# Patient Record
Sex: FEMALE | Race: WHITE | Employment: OTHER | URBAN - METROPOLITAN AREA
[De-identification: names, ages, dates, MRNs, and addresses within clinical notes are randomized per-mention and may not be internally consistent; named-entity substitution may affect disease eponyms.]

---

## 2017-01-04 ENCOUNTER — OFFICE VISIT (OUTPATIENT)
Dept: INTERNAL MEDICINE CLINIC | Facility: CLINIC | Age: 37
End: 2017-01-04

## 2017-01-04 VITALS
HEIGHT: 65 IN | TEMPERATURE: 98 F | BODY MASS INDEX: 23.49 KG/M2 | SYSTOLIC BLOOD PRESSURE: 102 MMHG | DIASTOLIC BLOOD PRESSURE: 58 MMHG | WEIGHT: 141 LBS | HEART RATE: 69 BPM | OXYGEN SATURATION: 99 %

## 2017-01-04 DIAGNOSIS — Z00.00 ANNUAL PHYSICAL EXAM: Primary | ICD-10-CM

## 2017-01-04 DIAGNOSIS — E55.9 VITAMIN D DEFICIENCY: ICD-10-CM

## 2017-01-04 DIAGNOSIS — R53.83 FATIGUE, UNSPECIFIED TYPE: ICD-10-CM

## 2017-01-04 PROCEDURE — 99213 OFFICE O/P EST LOW 20 MIN: CPT | Performed by: INTERNAL MEDICINE

## 2017-01-04 PROCEDURE — 99395 PREV VISIT EST AGE 18-39: CPT | Performed by: INTERNAL MEDICINE

## 2017-01-04 RX ORDER — ERGOCALCIFEROL 1.25 MG/1
50000 CAPSULE ORAL WEEKLY
Qty: 12 CAPSULE | Refills: 0 | Status: SHIPPED | OUTPATIENT
Start: 2017-01-04 | End: 2017-01-16

## 2017-01-04 NOTE — PROGRESS NOTES
Say Desouza is a 43year old female. Patient presents with:  Physical      HPI:   Say Desouza is a 43year old female who presents for a complete physical exam.      Her last physical was about 2 1/2 years ago.  Her past medical history includes hashim g Rfl: 2   SUMAtriptan Succinate (IMITREX) 50 MG Oral Tab Use at onset; repeat once after 2 hours-ONLY 2 IN 24 HR PERIOD MAX DOSE Disp: 9 tablet Rfl: 0      Past Medical History   Diagnosis Date   • Amenorrhea      Pt reports menses have always been irregu Ht 5' 5\" (1.651 m)  Wt 141 lb (63.957 kg)  BMI 23.46 kg/m2  SpO2 99%    GENERAL: well developed, well nourished, in no apparent distress  HEENT: normal oropharynx, normal TM's  EYES: PERRLA, EOMI, conjunctivae are pink  NECK: supple, no cervical or supra

## 2017-01-07 ENCOUNTER — LAB ENCOUNTER (OUTPATIENT)
Dept: LAB | Age: 37
End: 2017-01-07
Attending: INTERNAL MEDICINE
Payer: COMMERCIAL

## 2017-01-07 DIAGNOSIS — Z00.00 ANNUAL PHYSICAL EXAM: ICD-10-CM

## 2017-01-07 DIAGNOSIS — R53.83 FATIGUE, UNSPECIFIED TYPE: ICD-10-CM

## 2017-01-07 LAB
ALBUMIN SERPL BCP-MCNC: 4.5 G/DL (ref 3.5–4.8)
ALBUMIN/GLOB SERPL: 2 {RATIO} (ref 1–2)
ALP SERPL-CCNC: 58 U/L (ref 32–100)
ALT SERPL-CCNC: 16 U/L (ref 14–54)
ANION GAP SERPL CALC-SCNC: 8 MMOL/L (ref 0–18)
AST SERPL-CCNC: 22 U/L (ref 15–41)
BASOPHILS # BLD: 0.1 K/UL (ref 0–0.2)
BASOPHILS NFR BLD: 1 %
BILIRUB SERPL-MCNC: 0.9 MG/DL (ref 0.3–1.2)
BUN SERPL-MCNC: 8 MG/DL (ref 8–20)
BUN/CREAT SERPL: 9.9 (ref 10–20)
CALCIUM SERPL-MCNC: 9 MG/DL (ref 8.5–10.5)
CHLORIDE SERPL-SCNC: 107 MMOL/L (ref 95–110)
CHOLEST SERPL-MCNC: 194 MG/DL (ref 110–200)
CO2 SERPL-SCNC: 25 MMOL/L (ref 22–32)
CREAT SERPL-MCNC: 0.81 MG/DL (ref 0.5–1.5)
EOSINOPHIL # BLD: 0.1 K/UL (ref 0–0.7)
EOSINOPHIL NFR BLD: 2 %
ERYTHROCYTE [DISTWIDTH] IN BLOOD BY AUTOMATED COUNT: 12.7 % (ref 11–15)
GLOBULIN PLAS-MCNC: 2.3 G/DL (ref 2.5–3.7)
GLUCOSE SERPL-MCNC: 94 MG/DL (ref 70–99)
HCT VFR BLD AUTO: 43.9 % (ref 35–48)
HDLC SERPL-MCNC: 54 MG/DL
HGB BLD-MCNC: 14.9 G/DL (ref 12–16)
LDLC SERPL CALC-MCNC: 131 MG/DL (ref 0–99)
LYMPHOCYTES # BLD: 2.6 K/UL (ref 1–4)
LYMPHOCYTES NFR BLD: 45 %
MCH RBC QN AUTO: 32.6 PG (ref 27–32)
MCHC RBC AUTO-ENTMCNC: 33.9 G/DL (ref 32–37)
MCV RBC AUTO: 96.1 FL (ref 80–100)
MONOCYTES # BLD: 0.3 K/UL (ref 0–1)
MONOCYTES NFR BLD: 6 %
NEUTROPHILS # BLD AUTO: 2.7 K/UL (ref 1.8–7.7)
NEUTROPHILS NFR BLD: 46 %
NONHDLC SERPL-MCNC: 140 MG/DL
OSMOLALITY UR CALC.SUM OF ELEC: 288 MOSM/KG (ref 275–295)
PLATELET # BLD AUTO: 227 K/UL (ref 140–400)
PMV BLD AUTO: 10.6 FL (ref 7.4–10.3)
POTASSIUM SERPL-SCNC: 4.1 MMOL/L (ref 3.3–5.1)
PROT SERPL-MCNC: 6.8 G/DL (ref 5.9–8.4)
RBC # BLD AUTO: 4.57 M/UL (ref 3.7–5.4)
SODIUM SERPL-SCNC: 140 MMOL/L (ref 136–144)
TRIGL SERPL-MCNC: 46 MG/DL (ref 1–149)
WBC # BLD AUTO: 5.8 K/UL (ref 4–11)

## 2017-01-07 PROCEDURE — 80061 LIPID PANEL: CPT

## 2017-01-07 PROCEDURE — 80053 COMPREHEN METABOLIC PANEL: CPT

## 2017-01-07 PROCEDURE — 36415 COLL VENOUS BLD VENIPUNCTURE: CPT

## 2017-01-07 PROCEDURE — 85025 COMPLETE CBC W/AUTO DIFF WBC: CPT

## 2017-01-11 ENCOUNTER — TELEPHONE (OUTPATIENT)
Dept: INTERNAL MEDICINE CLINIC | Facility: CLINIC | Age: 37
End: 2017-01-11

## 2017-01-12 NOTE — TELEPHONE ENCOUNTER
Called patient and relayed Dr Chavo Hayden message on voicemail per hipaa. Instructed to call back with any questions.

## 2017-01-23 ENCOUNTER — OFFICE VISIT (OUTPATIENT)
Dept: OTOLARYNGOLOGY | Facility: CLINIC | Age: 37
End: 2017-01-23

## 2017-01-23 VITALS — HEIGHT: 66 IN | TEMPERATURE: 98 F | WEIGHT: 137 LBS | BODY MASS INDEX: 22.02 KG/M2

## 2017-01-23 DIAGNOSIS — R59.9 LYMPH NODE ENLARGEMENT: Primary | ICD-10-CM

## 2017-01-23 PROCEDURE — 99213 OFFICE O/P EST LOW 20 MIN: CPT | Performed by: OTOLARYNGOLOGY

## 2017-01-23 PROCEDURE — 99212 OFFICE O/P EST SF 10 MIN: CPT | Performed by: OTOLARYNGOLOGY

## 2017-01-23 RX ORDER — ERGOCALCIFEROL 1.25 MG/1
CAPSULE ORAL WEEKLY
COMMUNITY
End: 2018-08-31

## 2017-01-25 NOTE — PATIENT INSTRUCTIONS
Computed Tomography (CT)  Computed tomography (CT) is a test that combines X-rays and computer scans.  The result is a detailed picture that can show problems with soft tissues, such as the lining of your sinuses, organs, such as your kidneys or lungs, bl · You can go back to your normal diet and activities right away. Any contrast will pass naturally through your body within a day. · Before leaving, you may need to wait briefly while your images are being reviewed.  Your doctor will discuss the test result

## 2017-01-25 NOTE — PROGRESS NOTES
Elsy Flores is a 43year old female. Patient presents with:  Lymph Node: f/u acute lymphadenopathy, per pt still  swollen, no pain or tenderness     11/2014  Patient  presents with ear pain in both ears for many years.  She recently has had issues with No  Exercise                Yes    Comment:1 x week  weights and cardio  Seat Belt               Yes        Family History   Problem Relation Age of Onset   • Alcohol and Other Disorders Associated Mother         • Danny Alexus Son Overall appearance - Normal.   Psychiatric Normal Orientation - Oriented to time, place, person & situation. Appropriate mood and affect.    Neck Exam Normal Inspection - Normal. Palpation - Normal. Parotid gland - Normal. Thyroid gland - Normal.   Eyes Nor contrast consider open biopsy.     David Brown MD

## 2017-01-30 ENCOUNTER — TELEPHONE (OUTPATIENT)
Dept: OTOLARYNGOLOGY | Facility: CLINIC | Age: 37
End: 2017-01-30

## 2017-01-30 NOTE — TELEPHONE ENCOUNTER
Called pt's insurance [phone], [facility] a [facility] is not needed for CT soft tissue of neck, reference number [id number]. Left message for pt to call back to inform that she can schedule CT scan at this time.  It is the patient's re

## 2017-02-07 ENCOUNTER — TELEPHONE (OUTPATIENT)
Dept: INTERNAL MEDICINE CLINIC | Facility: CLINIC | Age: 37
End: 2017-02-07

## 2017-02-07 RX ORDER — AMOXICILLIN AND CLAVULANATE POTASSIUM 875; 125 MG/1; MG/1
1 TABLET, FILM COATED ORAL 2 TIMES DAILY
Qty: 20 TABLET | Refills: 0 | Status: SHIPPED | OUTPATIENT
Start: 2017-02-07 | End: 2017-04-12 | Stop reason: ALTCHOICE

## 2017-02-07 NOTE — TELEPHONE ENCOUNTER
Patient is complaining of sinus pain/pressure since Sunday, runny nose with greenish-yellowish mucus, pressure in the teeth, general malaise. Chills Saturday night. Cough just from the sensation that she constantly needs to clear her throat. Denies fever.

## 2017-02-07 NOTE — TELEPHONE ENCOUNTER
Pt is having sinus pain - it started on Sun. She said that she has chronic sinus problems. She is asking for antibiotic to be prescribed. She is aware that  is not here today, and that she might need to be seen. OK to leave a message on the machine.

## 2017-02-13 ENCOUNTER — APPOINTMENT (OUTPATIENT)
Dept: LAB | Age: 37
End: 2017-02-13
Attending: OTOLARYNGOLOGY
Payer: COMMERCIAL

## 2017-02-13 DIAGNOSIS — R59.9 LYMPH NODE ENLARGEMENT: ICD-10-CM

## 2017-02-13 PROCEDURE — 36415 COLL VENOUS BLD VENIPUNCTURE: CPT

## 2017-02-13 PROCEDURE — 86003 ALLG SPEC IGE CRUDE XTRC EA: CPT

## 2017-02-13 PROCEDURE — 82785 ASSAY OF IGE: CPT

## 2017-02-15 NOTE — TELEPHONE ENCOUNTER
Pt called back and was informed that she can schedule CT soft tissue of neck, a prior authorization was not needed. It is the patient's responsibility to verify benefits and out of pocket expenses for this service, pt verbalized understanding.

## 2017-02-16 LAB
A ALTERNATA IGE QN: 2.37 KUA/L (ref ?–0.1)
A ALTERNATA IGE QN: 2.53 KUA/L (ref ?–0.1)
A FUMIGATUS IGE QN: <0.1 KUA/L (ref ?–0.1)
AMER SYCAMORE IGE QN: <0.1 KUA/L (ref ?–0.1)
BERMUDA GRASS IGE QN: <0.1 KUA/L (ref ?–0.1)
BOXELDER IGE QN: <0.1 KUA/L (ref ?–0.1)
C HERBARUM IGE QN: <0.1 KUA/L (ref ?–0.1)
C HERBARUM IGE QN: <0.1 KUA/L (ref ?–0.1)
CALIF WALNUT IGE QN: <0.1 KUA/L (ref ?–0.1)
CAT DANDER IGE QN: <0.1 KUA/L (ref ?–0.1)
CAT DANDER IGE QN: <0.1 KUA/L (ref ?–0.1)
CLAM IGE QN: <0.1 KUA/L (ref ?–0.1)
CMN PIGWEED IGE QN: <0.1 KUA/L (ref ?–0.1)
CODFISH IGE QN: <0.1 KUA/L (ref ?–0.1)
COMMON RAGWEED IGE QN: 0.4 KUA/L (ref ?–0.1)
COMMON RAGWEED IGE QN: 0.43 KUA/L (ref ?–0.1)
CORN IGE QN: <0.1 KUA/L (ref ?–0.1)
COTTONWOOD IGE QN: <0.1 KUA/L (ref ?–0.1)
COW MILK IGE QN: <0.1 KUA/L (ref ?–0.1)
D FARINAE IGE QN: <0.1 KUA/L (ref ?–0.1)
D FARINAE IGE QN: <0.1 KUA/L (ref ?–0.1)
D PTERONYSS IGE QN: <0.1 KUA/L (ref ?–0.1)
DOG DANDER IGE QN: <0.1 KUA/L (ref ?–0.1)
DOG DANDER IGE QN: <0.1 KUA/L (ref ?–0.1)
EGG WHITE IGE QN: <0.1 KUA/L (ref ?–0.1)
GOOSEFOOT IGE QN: <0.1 KUA/L (ref ?–0.1)
HOUSE DUST HS IGE QN: <0.1 KUA/L (ref ?–0.1)
IGE SERPL-ACNC: 12.5 KU/L (ref 2–214)
IGE SERPL-ACNC: 13.1 KU/L (ref 2–214)
IGE SERPL-ACNC: 13.1 KU/L (ref 2–214)
KENT BLUE GRASS IGE QN: <0.1 KUA/L (ref ?–0.1)
M RACEMOSUS IGE QN: <0.1 KUA/L (ref ?–0.1)
MARSH ELDER IGE QN: 0.14 KUA/L (ref ?–0.1)
MOUSE EPITH IGE QN: <0.1 KUA/L (ref ?–0.1)
MT JUNIPER IGE QN: <0.1 KUA/L (ref ?–0.1)
P NOTATUM IGE QN: <0.1 KUA/L (ref ?–0.1)
PEANUT IGE QN: <0.1 KUA/L (ref ?–0.1)
PECAN/HICK TREE IGE QN: <0.1 KUA/L (ref ?–0.1)
PER RYE GRASS IGE QN: <0.1 KUA/L (ref ?–0.1)
ROACH IGE QN: <0.1 KUA/L (ref ?–0.1)
SALTWORT IGE QN: <0.1 KUA/L (ref ?–0.1)
SCALLOP IGE QN: <0.1 KUA/L (ref ?–0.1)
SESAME SEED IGE QN: <0.1 KUA/L (ref ?–0.1)
SHRIMP IGE QN: <0.1 KUA/L (ref ?–0.1)
SOYBEAN IGE QN: <0.1 KUA/L (ref ?–0.1)
TIMOTHY IGE QN: <0.1 KUA/L (ref ?–0.1)
WALNUT IGE QN: <0.1 KUA/L (ref ?–0.1)
WHEAT IGE QN: <0.1 KUA/L (ref ?–0.1)
WHITE ASH IGE QN: <0.1 KUA/L (ref ?–0.1)
WHITE ELM IGE QN: <0.1 KUA/L (ref ?–0.1)
WHITE ELM IGE QN: <0.1 KUA/L (ref ?–0.1)
WHITE MULBERRY IGE QN: <0.1 KUA/L (ref ?–0.1)
WHITE OAK IGE QN: <0.1 KUA/L (ref ?–0.1)
WHITE OAK IGE QN: <0.1 KUA/L (ref ?–0.1)

## 2017-02-23 ENCOUNTER — TELEPHONE (OUTPATIENT)
Dept: OTOLARYNGOLOGY | Facility: CLINIC | Age: 37
End: 2017-02-23

## 2017-05-03 ENCOUNTER — TELEPHONE (OUTPATIENT)
Dept: INTERNAL MEDICINE CLINIC | Facility: CLINIC | Age: 37
End: 2017-05-03

## 2017-05-03 DIAGNOSIS — M76.899 HIP FLEXOR TENDINITIS, UNSPECIFIED LATERALITY: Primary | ICD-10-CM

## 2017-05-03 NOTE — TELEPHONE ENCOUNTER
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 Has taken three weeks off from running and it has not gotten better.  She went in for a consultation with [facility]; per the patient, the therapist she saw to

## 2017-05-04 NOTE — TELEPHONE ENCOUNTER
Please notify patient that I will mail the referral to her for [facility]. If her symptoms worsen, she should come in for evaluation.

## 2017-06-05 ENCOUNTER — TELEPHONE (OUTPATIENT)
Dept: ENDOCRINOLOGY CLINIC | Facility: CLINIC | Age: 37
End: 2017-06-05

## 2017-06-05 DIAGNOSIS — E03.9 HYPOTHYROIDISM, UNSPECIFIED TYPE: Primary | ICD-10-CM

## 2017-06-05 NOTE — TELEPHONE ENCOUNTER
Called patient and let her know SH would like to send her for thyroid labwork and then book follow up. May need to move up appt depending on results. Patient agreeable. Orders placed and booked patient for appt on 8/4.

## 2017-06-05 NOTE — TELEPHONE ENCOUNTER
Pt states that she has dry skin and is having more hair loss and thinks thyroid levels are off. Pt is requesting appt sooner than first available. Please call.

## 2017-06-05 NOTE — TELEPHONE ENCOUNTER
Please send for labs TSH, FT4. Schedule appt for next available and then will move up appt based on labs.

## 2017-06-14 ENCOUNTER — APPOINTMENT (OUTPATIENT)
Dept: LAB | Age: 37
End: 2017-06-14
Attending: INTERNAL MEDICINE
Payer: COMMERCIAL

## 2017-06-14 DIAGNOSIS — E03.9 HYPOTHYROIDISM, UNSPECIFIED TYPE: ICD-10-CM

## 2017-06-14 PROCEDURE — 36415 COLL VENOUS BLD VENIPUNCTURE: CPT

## 2017-06-14 PROCEDURE — 84439 ASSAY OF FREE THYROXINE: CPT

## 2017-06-14 PROCEDURE — 84443 ASSAY THYROID STIM HORMONE: CPT

## 2017-06-22 RX ORDER — LEVOTHYROXINE SODIUM 112 MCG
TABLET ORAL
Qty: 30 TABLET | Refills: 1 | Status: SHIPPED | OUTPATIENT
Start: 2017-06-22 | End: 2017-07-20

## 2017-07-20 RX ORDER — LEVOTHYROXINE SODIUM 112 MCG
TABLET ORAL
Qty: 30 TABLET | Refills: 0 | Status: SHIPPED | OUTPATIENT
Start: 2017-07-20 | End: 2017-10-06

## 2017-07-20 NOTE — TELEPHONE ENCOUNTER
Dr. Les Larkin, please review rx request for Synthroid 112 mcg. Thank you.     LOV: 5/6/17  RTC: 1 year  Last Refill: 6/22/17

## 2017-08-04 ENCOUNTER — OFFICE VISIT (OUTPATIENT)
Dept: ENDOCRINOLOGY CLINIC | Facility: CLINIC | Age: 37
End: 2017-08-04

## 2017-08-04 VITALS
DIASTOLIC BLOOD PRESSURE: 77 MMHG | WEIGHT: 147.19 LBS | BODY MASS INDEX: 24.52 KG/M2 | HEART RATE: 66 BPM | HEIGHT: 65 IN | SYSTOLIC BLOOD PRESSURE: 121 MMHG

## 2017-08-04 DIAGNOSIS — E06.3 HYPOTHYROIDISM DUE TO HASHIMOTO'S THYROIDITIS: Primary | ICD-10-CM

## 2017-08-04 DIAGNOSIS — E03.8 HYPOTHYROIDISM DUE TO HASHIMOTO'S THYROIDITIS: Primary | ICD-10-CM

## 2017-08-04 PROCEDURE — 99212 OFFICE O/P EST SF 10 MIN: CPT | Performed by: INTERNAL MEDICINE

## 2017-08-04 PROCEDURE — 99213 OFFICE O/P EST LOW 20 MIN: CPT | Performed by: INTERNAL MEDICINE

## 2017-08-04 RX ORDER — GARLIC EXTRACT 500 MG
1 CAPSULE ORAL DAILY
COMMUNITY

## 2017-08-04 RX ORDER — DOXEPIN HYDROCHLORIDE 50 MG/1
1 CAPSULE ORAL DAILY
COMMUNITY

## 2017-08-04 RX ORDER — CHLORAL HYDRATE 500 MG
1000 CAPSULE ORAL DAILY
COMMUNITY

## 2017-08-04 NOTE — PROGRESS NOTES
Name: Bruce House  Date: 8/4/2017    Referring Physician: No ref. provider found    No chief complaint on file. HISTORY OF PRESENT ILLNESS   Bruce Smoker is a 42year old female who presents for hypothyroidism.      43 y/o F presents for follow up Employer            Comment               Clinical Social Wo*                     Full Time    Social History Main Topics    Smoking status: Never Smoker                                                                Smokeless tobacco: Never Used moisture and skin texture  Hair & Nails:  normal scalp hair     Neuro:  sensory grossly intact and motor grossly intact  Psychiatric:  oriented to time, self, and place  Nutritional:  no abnormal weight gain or loss    ASSESSMENT/PLAN:    1.  Hypothyroidism

## 2017-08-22 RX ORDER — LEVOTHYROXINE SODIUM 112 MCG
TABLET ORAL
Qty: 30 TABLET | Refills: 5 | Status: SHIPPED | OUTPATIENT
Start: 2017-08-22 | End: 2017-12-29

## 2017-10-06 ENCOUNTER — OFFICE VISIT (OUTPATIENT)
Dept: INTERNAL MEDICINE CLINIC | Facility: CLINIC | Age: 37
End: 2017-10-06

## 2017-10-06 VITALS
DIASTOLIC BLOOD PRESSURE: 80 MMHG | TEMPERATURE: 98 F | SYSTOLIC BLOOD PRESSURE: 110 MMHG | WEIGHT: 151 LBS | OXYGEN SATURATION: 99 % | HEART RATE: 60 BPM | BODY MASS INDEX: 25 KG/M2

## 2017-10-06 DIAGNOSIS — F41.9 ANXIETY: ICD-10-CM

## 2017-10-06 DIAGNOSIS — B35.0 FUNGAL SCALP INFECTION: ICD-10-CM

## 2017-10-06 DIAGNOSIS — G43.809 OTHER MIGRAINE WITHOUT STATUS MIGRAINOSUS, NOT INTRACTABLE: Primary | ICD-10-CM

## 2017-10-06 DIAGNOSIS — Z23 NEED FOR VACCINATION: ICD-10-CM

## 2017-10-06 PROCEDURE — 99212 OFFICE O/P EST SF 10 MIN: CPT | Performed by: INTERNAL MEDICINE

## 2017-10-06 PROCEDURE — 99214 OFFICE O/P EST MOD 30 MIN: CPT | Performed by: INTERNAL MEDICINE

## 2017-10-06 RX ORDER — ESCITALOPRAM OXALATE 5 MG/1
TABLET ORAL
Qty: 45 TABLET | Refills: 0 | Status: SHIPPED | OUTPATIENT
Start: 2017-10-06 | End: 2017-11-15

## 2017-10-06 RX ORDER — ERGOCALCIFEROL 1.25 MG/1
50000 CAPSULE ORAL WEEKLY
Refills: 0 | Status: CANCELLED | OUTPATIENT
Start: 2017-10-06

## 2017-10-06 RX ORDER — SUMATRIPTAN 50 MG/1
TABLET, FILM COATED ORAL
Qty: 9 TABLET | Refills: 0 | Status: SHIPPED | OUTPATIENT
Start: 2017-10-06 | End: 2018-08-31

## 2017-10-06 RX ORDER — ALPRAZOLAM 0.5 MG/1
0.5 TABLET ORAL NIGHTLY PRN
Qty: 10 TABLET | Refills: 0 | Status: SHIPPED | OUTPATIENT
Start: 2017-10-06 | End: 2020-06-29

## 2017-10-06 NOTE — PROGRESS NOTES
Yeimi Ramsey is a 42year old female. Patient presents with:  Itching: Pt states of a patch of itchy skin on scalp, has tried otc shampoo, gel, tea tree oil, homeopathic, +dander  Medication Request: Refills for Imitrex, Vit D.  Pt is going to have a denta hours-ONLY 2 IN 24 HR PERIOD MAX DOSE Disp: 9 tablet Rfl: 0   ergocalciferol 55189 UNITS Oral Cap Take by mouth once a week. Disp:  Rfl:       Past Medical History:   Diagnosis Date   • Amenorrhea     Pt reports menses have always been irregular.    • Decor today  - FLULAVAL INFLUENZA VACCINE QUAD PRESERVATIVE FREE 0.5 ML    3. Fungal scalp infection  Trial of antifungal shampoo for 2 weeks; call if not better. - Ketoconazole 1 % External Shampoo; Apply 1 Application topically daily.   Dispense: 200 mL; Refi

## 2017-10-09 ENCOUNTER — TELEPHONE (OUTPATIENT)
Dept: INTERNAL MEDICINE CLINIC | Facility: CLINIC | Age: 37
End: 2017-10-09

## 2017-10-09 NOTE — TELEPHONE ENCOUNTER
Joellen faxed a note - there is a drug interaction between Sumatriptan and Escitalopram. Placed in purple folder.             Tasked to [employer]

## 2017-11-14 ENCOUNTER — PATIENT MESSAGE (OUTPATIENT)
Dept: INTERNAL MEDICINE CLINIC | Facility: CLINIC | Age: 37
End: 2017-11-14

## 2017-11-14 DIAGNOSIS — F41.9 ANXIETY: ICD-10-CM

## 2017-11-15 RX ORDER — ESCITALOPRAM OXALATE 5 MG/1
TABLET ORAL
Qty: 90 TABLET | Refills: 3 | Status: SHIPPED | OUTPATIENT
Start: 2017-11-15 | End: 2020-06-29

## 2017-11-15 NOTE — TELEPHONE ENCOUNTER
From: Sim Almazan  To: Vania Bailey DO  Sent: 11/14/2017 9:22 PM CST  Subject: Visit Tamiko Nieto,  I realize this message is very delayed. Wanted to follow up on my last appointment. First the Lexapro.  I have noticed a marked

## 2017-12-29 ENCOUNTER — TELEPHONE (OUTPATIENT)
Dept: ENDOCRINOLOGY CLINIC | Facility: CLINIC | Age: 37
End: 2017-12-29

## 2017-12-29 DIAGNOSIS — E03.9 HYPOTHYROIDISM, UNSPECIFIED TYPE: Primary | ICD-10-CM

## 2017-12-29 RX ORDER — LEVOTHYROXINE SODIUM 112 MCG
TABLET ORAL
Qty: 30 TABLET | Refills: 5 | Status: SHIPPED | OUTPATIENT
Start: 2017-12-29 | End: 2019-03-08

## 2017-12-29 NOTE — TELEPHONE ENCOUNTER
Pt states that she has been cold more often and is very fatigued in morning and also is not losing weight despite exercising and watching diet. She would like orders for labs to check thyroid.   She would like to have labs done before the New Year due to i

## 2017-12-30 ENCOUNTER — APPOINTMENT (OUTPATIENT)
Dept: LAB | Age: 37
End: 2017-12-30
Attending: INTERNAL MEDICINE
Payer: COMMERCIAL

## 2017-12-30 DIAGNOSIS — E03.9 HYPOTHYROIDISM, UNSPECIFIED TYPE: ICD-10-CM

## 2017-12-30 LAB
T4 FREE SERPL-MCNC: 1.26 NG/DL (ref 0.58–1.64)
TSH SERPL-ACNC: 1.05 UIU/ML (ref 0.45–5.33)

## 2017-12-30 PROCEDURE — 84443 ASSAY THYROID STIM HORMONE: CPT

## 2017-12-30 PROCEDURE — 84439 ASSAY OF FREE THYROXINE: CPT

## 2017-12-30 PROCEDURE — 36415 COLL VENOUS BLD VENIPUNCTURE: CPT

## 2018-01-05 ENCOUNTER — TELEPHONE (OUTPATIENT)
Dept: ENDOCRINOLOGY CLINIC | Facility: CLINIC | Age: 38
End: 2018-01-05

## 2018-01-05 NOTE — TELEPHONE ENCOUNTER
Her thyroid levels are actually at the high end of normal.  Just a reminder that the TSH moves opposite of the actual thyroid function so when she is at the low end of normal that is actually the higher end of thyroid replacement.

## 2018-01-05 NOTE — TELEPHONE ENCOUNTER
Pt notified of lab interpretation. Verbalized understanding. She is frustrated that she has been working really hard with diet and exercise and she cannot lose weight.   States she read that a low carb diet can interfere with T3 and would like doctor to w

## 2018-01-05 NOTE — TELEPHONE ENCOUNTER
Spoke with pt. States she reviewed her normal lab results. She is c/o fatigue, difficulty losing weight and hair loss. Following a low carb diet at 25 grams per day.   She is wondering if her medication should be adjusted as she feels better when she is

## 2018-01-05 NOTE — TELEPHONE ENCOUNTER
Pt requesting a call back regarding questions she has about lab results completed on 12/30/17.  Please call thank you [phone]

## 2018-02-13 RX ORDER — LEVOTHYROXINE SODIUM 112 MCG
TABLET ORAL
Qty: 30 TABLET | Refills: 5 | Status: SHIPPED | OUTPATIENT
Start: 2018-02-13 | End: 2018-08-31

## 2018-04-28 ENCOUNTER — HOSPITAL ENCOUNTER (OUTPATIENT)
Age: 38
Discharge: HOME | End: 2018-04-28
Attending: EMERGENCY MEDICINE
Payer: COMMERCIAL

## 2018-04-28 ENCOUNTER — APPOINTMENT (OUTPATIENT)
Dept: GENERAL RADIOLOGY | Age: 38
End: 2018-04-28
Attending: EMERGENCY MEDICINE
Payer: COMMERCIAL

## 2018-04-28 VITALS
SYSTOLIC BLOOD PRESSURE: 107 MMHG | DIASTOLIC BLOOD PRESSURE: 65 MMHG | WEIGHT: 150 LBS | HEIGHT: 65 IN | RESPIRATION RATE: 18 BRPM | BODY MASS INDEX: 24.99 KG/M2 | TEMPERATURE: 98 F | OXYGEN SATURATION: 97 % | HEART RATE: 62 BPM

## 2018-04-28 DIAGNOSIS — S92.521A CLOSED DISPLACED FRACTURE OF MIDDLE PHALANX OF LESSER TOE OF RIGHT FOOT, INITIAL ENCOUNTER: Primary | ICD-10-CM

## 2018-04-28 PROCEDURE — 99214 OFFICE O/P EST MOD 30 MIN: CPT

## 2018-04-28 PROCEDURE — 73630 X-RAY EXAM OF FOOT: CPT | Performed by: EMERGENCY MEDICINE

## 2018-04-28 PROCEDURE — 28510 TREATMENT OF TOE FRACTURE: CPT

## 2018-04-28 PROCEDURE — 99213 OFFICE O/P EST LOW 20 MIN: CPT

## 2018-04-29 NOTE — ED PROVIDER NOTES
Patient Seen in: [facility] [address] [place] [address]    History   Patient presents with:  Lower Extremity Injury (musculoskeletal)    Stated Complaint: toe injury    HPI    HPI: Danielle Brownlee is a 42year old female who presents after an injury Suspension,  2 sprays by Each Nare route daily. ergocalciferol 57628 UNITS Oral Cap,  Take by mouth once a week.        Family History   Problem Relation Age of Onset   • Alcohol and Other Disorders Associated Mother         • hypothyroid Jose Manuel Crabtree tenderness. NEURO:Sensation to touch is intact. SKIN: No open wounds, no rashes. PSYCH: Normal affect. Calm and cooperative.     ED Course   Labs Reviewed - No data to display    MDM   Xr Foot, Complete (min 3 Views), Right (cpt=73630)    Result Date: 4/28

## 2018-04-30 ENCOUNTER — TELEPHONE (OUTPATIENT)
Dept: INTERNAL MEDICINE CLINIC | Facility: CLINIC | Age: 38
End: 2018-04-30

## 2018-04-30 DIAGNOSIS — S92.521D CLOSED DISPLACED FRACTURE OF MIDDLE PHALANX OF LESSER TOE OF RIGHT FOOT WITH ROUTINE HEALING, SUBSEQUENT ENCOUNTER: Primary | ICD-10-CM

## 2018-04-30 NOTE — TELEPHONE ENCOUNTER
Noted in EPIC that patient went to urgent care on 4/28----dx Closed displaced fracture of middle phalanx of lesser toe of right foot, initial encounter    Referral pended below for Dr Gorge Mason    To Dr Juan Manuel Galloway

## 2018-04-30 NOTE — TELEPHONE ENCOUNTER
Referral placed; please notify patient. Also if she needs anything, please let us know, but Dr. Shana Romo should be able to take care of her well.

## 2018-04-30 NOTE — TELEPHONE ENCOUNTER
Pt. Is calling to get a referral for an ortho or podiatrist she fractured her pinky toe on her right foot she would like to see Dr. Gloria Alt  Ph. # [phone]    routed to clinical

## 2018-06-01 ENCOUNTER — MED REC SCAN ONLY (OUTPATIENT)
Dept: INTERNAL MEDICINE CLINIC | Facility: CLINIC | Age: 38
End: 2018-06-01

## 2018-06-23 NOTE — TELEPHONE ENCOUNTER
Called patient. She has been experiencing dry skin and hair loss (more than usual) for about 1 month. She states she also has increased fatigue in the morning. She denies abnormal weight gain or loss, heart palpitations, anxiety, or fast HR.  She tried to s Problem: Physical Therapy Goal  Goal: Physical Therapy Goal  Outcome: Outcome(s) achieved Date Met: 06/23/18  Eval and DC complete  Patient has no inpatient PT needs  Discontinue Orders    Dustin Goyal, PT, DPT  6/23/2018  Pager [phone]

## 2018-07-24 ENCOUNTER — TELEPHONE (OUTPATIENT)
Dept: OBGYN CLINIC | Facility: CLINIC | Age: 38
End: 2018-07-24

## 2018-07-25 NOTE — TELEPHONE ENCOUNTER
Advised pt that IUD was placed 6/3/15 & is good up to 5 years, so must be replaced by 6/3/2020. Pt also overdue for annual exam. Pt states she will call back to schedule once she has her calendar in front of her.  Pt verbalized an understanding & agrees w/

## 2018-07-30 ENCOUNTER — TELEPHONE (OUTPATIENT)
Dept: INTERNAL MEDICINE CLINIC | Facility: CLINIC | Age: 38
End: 2018-07-30

## 2018-07-30 DIAGNOSIS — M77.50 TENDONITIS OF ANKLE OR FOOT: Primary | ICD-10-CM

## 2018-07-30 NOTE — TELEPHONE ENCOUNTER
Pt called for insurance referral to see Dr Kat Alanis for tendonitis in her left foot  Has [employer] o  Pt can be reached on her cell # for questions & to confirm  [phone]

## 2018-08-28 ENCOUNTER — TELEPHONE (OUTPATIENT)
Dept: INTERNAL MEDICINE CLINIC | Facility: CLINIC | Age: 38
End: 2018-08-28

## 2018-08-28 DIAGNOSIS — E06.3 HASHIMOTO'S THYROIDITIS: Primary | ICD-10-CM

## 2018-08-29 NOTE — TELEPHONE ENCOUNTER
To  - please call patient to schedule a physical with DR. PRADO - has not been seen since January 2017 thanks

## 2018-08-29 NOTE — TELEPHONE ENCOUNTER
Please notify patient that I placed referral for Dr. Desire Weir; also I have not seen patient for physical since 1/2017, would recommend that she schedule.

## 2018-08-31 ENCOUNTER — OFFICE VISIT (OUTPATIENT)
Dept: INTERNAL MEDICINE CLINIC | Facility: CLINIC | Age: 38
End: 2018-08-31

## 2018-08-31 VITALS
WEIGHT: 152 LBS | HEIGHT: 65.5 IN | TEMPERATURE: 98 F | OXYGEN SATURATION: 100 % | DIASTOLIC BLOOD PRESSURE: 78 MMHG | SYSTOLIC BLOOD PRESSURE: 120 MMHG | BODY MASS INDEX: 25.02 KG/M2

## 2018-08-31 DIAGNOSIS — B35.0 FUNGAL SCALP INFECTION: ICD-10-CM

## 2018-08-31 DIAGNOSIS — G43.809 OTHER MIGRAINE WITHOUT STATUS MIGRAINOSUS, NOT INTRACTABLE: ICD-10-CM

## 2018-08-31 DIAGNOSIS — E55.9 VITAMIN D DEFICIENCY: ICD-10-CM

## 2018-08-31 DIAGNOSIS — E06.3 HASHIMOTO'S THYROIDITIS: ICD-10-CM

## 2018-08-31 DIAGNOSIS — B35.0 TINEA CAPITIS: ICD-10-CM

## 2018-08-31 DIAGNOSIS — Z00.00 ANNUAL PHYSICAL EXAM: Primary | ICD-10-CM

## 2018-08-31 DIAGNOSIS — Z12.39 SCREENING FOR BREAST CANCER: ICD-10-CM

## 2018-08-31 PROCEDURE — 99396 PREV VISIT EST AGE 40-64: CPT | Performed by: INTERNAL MEDICINE

## 2018-08-31 RX ORDER — ERGOCALCIFEROL 1.25 MG/1
50000 CAPSULE ORAL WEEKLY
Qty: 12 CAPSULE | Refills: 3 | Status: SHIPPED | OUTPATIENT
Start: 2018-08-31

## 2018-08-31 RX ORDER — SUMATRIPTAN 50 MG/1
TABLET, FILM COATED ORAL
Qty: 9 TABLET | Refills: 0 | Status: SHIPPED | OUTPATIENT
Start: 2018-08-31 | End: 2020-07-06

## 2018-08-31 RX ORDER — LEVOTHYROXINE SODIUM 112 MCG
TABLET ORAL
Qty: 30 TABLET | Refills: 5 | Status: CANCELLED | OUTPATIENT
Start: 2018-08-31

## 2018-08-31 RX ORDER — ALPRAZOLAM 0.25 MG/1
0.25 TABLET ORAL NIGHTLY PRN
Qty: 30 TABLET | Refills: 0 | Status: SHIPPED | OUTPATIENT
Start: 2018-08-31 | End: 2020-06-29 | Stop reason: DRUGHIGH

## 2018-08-31 RX ORDER — LEVOTHYROXINE SODIUM 112 MCG
112 TABLET ORAL
Qty: 90 TABLET | Refills: 1 | Status: SHIPPED | OUTPATIENT
Start: 2018-08-31 | End: 2020-06-29

## 2018-08-31 RX ORDER — KETOCONAZOLE 20 MG/ML
5 SHAMPOO TOPICAL
Qty: 120 ML | Refills: 5 | Status: SHIPPED | OUTPATIENT
Start: 2018-09-03 | End: 2020-06-29

## 2018-08-31 NOTE — PROGRESS NOTES
REASON FOR VISIT:    Lyla Romberg is a 34year old female who presents for an [address]. Her last physical was about 2 1/2 years ago. Her past medical history includes hashimoto's hypothyroidism, amenorrhea.    She was diagnosed with Has Screening Screen all adults annually Body mass index is 24.91 kg/m².      Preventive Services for Which Recommendations Vary with Risk Recommendation Internal Lab or Procedure   Cholesterol Screening Recommended screening varies with age, risk and gender LD Application topically daily. Disp: 1 Tube Rfl: 1   SYNTHROID 112 MCG Oral Tab TAKE 1 TABLET BY MOUTH EVERY DAY BEFORE BREAKFAST Disp: 30 tablet Rfl: 5   Ketoconazole 1 % External Shampoo Apply 1 Application topically daily.  Disp: 200 mL Rfl: 0   ALPRAZolam use: Yes           0.0 oz/week     Comment: 2 glasses wine/week    Occ:  :      REVIEW OF SYSTEMS:   GENERAL: feels well otherwise  SKIN: denies any unusual skin lesions  EYES: denies blurred vision or double vision  HEENT: denies nasal congestion, after 2 hours-ONLY 2 IN 24 HR PERIOD MAX DOSE    Fungal scalp infection    Tinea capitis  -     Ketoconazole 2 % External Shampoo; Apply 5 mL topically twice a week. Other orders  -     ergocalciferol 77532 units Oral Cap;  Take 1 capsule (50,000 Units t

## 2018-09-11 ENCOUNTER — TELEPHONE (OUTPATIENT)
Dept: INTERNAL MEDICINE CLINIC | Facility: CLINIC | Age: 38
End: 2018-09-11

## 2018-09-11 DIAGNOSIS — E06.3 HASHIMOTO'S THYROIDITIS: Primary | ICD-10-CM

## 2018-09-11 NOTE — TELEPHONE ENCOUNTER
Dr. Tianna Chavez message relayed on pt's VM.   Asking her to call back if she desires to see Dr. Mercado Officer and we can do referral.

## 2018-09-11 NOTE — TELEPHONE ENCOUNTER
Pt called Dr Bernardino Birch to get an harini but she is not taking new patients with HMO at this time  Pt is calling to get another recommendation and written referral for an Endocrinologist  Please call pt with info she is aware Dr Lisbeth Valerio is out today, [phone]   Tas

## 2018-09-11 NOTE — TELEPHONE ENCOUNTER
Per 8/31/18 OV note, patient looking for a new endocrinologist referral. Previously seen by Dr. eDlio Jackson. Now patient reports Dr. Nithin Rolon is not taking new patients. To Dr. Shanel Gilmore for recommendation. Referral pended.

## 2018-10-04 ENCOUNTER — APPOINTMENT (OUTPATIENT)
Dept: LAB | Facility: HOSPITAL | Age: 38
End: 2018-10-04
Attending: INTERNAL MEDICINE
Payer: COMMERCIAL

## 2018-10-04 ENCOUNTER — TELEPHONE (OUTPATIENT)
Dept: ENDOCRINOLOGY CLINIC | Facility: CLINIC | Age: 38
End: 2018-10-04

## 2018-10-04 DIAGNOSIS — E03.9 HYPOTHYROIDISM, UNSPECIFIED TYPE: ICD-10-CM

## 2018-10-04 DIAGNOSIS — E03.9 HYPOTHYROIDISM, UNSPECIFIED TYPE: Primary | ICD-10-CM

## 2018-10-04 PROCEDURE — 84481 FREE ASSAY (FT-3): CPT

## 2018-10-04 PROCEDURE — 84443 ASSAY THYROID STIM HORMONE: CPT

## 2018-10-04 PROCEDURE — 36415 COLL VENOUS BLD VENIPUNCTURE: CPT

## 2018-10-04 PROCEDURE — 84439 ASSAY OF FREE THYROXINE: CPT

## 2018-10-04 NOTE — TELEPHONE ENCOUNTER
Patient coming tomorrow morning for follow up for hypothyroidism. Asking to have levels drawn today. Ok to order?

## 2018-10-04 NOTE — TELEPHONE ENCOUNTER
Last thyroid labs 12/20/17. LOV 7/2017 for hypothyroidism d/t hashimotos. Patient requesting TSH, FT4, FT3, and reverse T3. OK to order?

## 2018-10-05 ENCOUNTER — OFFICE VISIT (OUTPATIENT)
Dept: ENDOCRINOLOGY CLINIC | Facility: CLINIC | Age: 38
End: 2018-10-05

## 2018-10-05 VITALS
WEIGHT: 155.81 LBS | BODY MASS INDEX: 26 KG/M2 | SYSTOLIC BLOOD PRESSURE: 118 MMHG | HEART RATE: 62 BPM | DIASTOLIC BLOOD PRESSURE: 76 MMHG

## 2018-10-05 DIAGNOSIS — E06.3 HYPOTHYROIDISM DUE TO HASHIMOTO'S THYROIDITIS: Primary | ICD-10-CM

## 2018-10-05 DIAGNOSIS — E03.8 HYPOTHYROIDISM DUE TO HASHIMOTO'S THYROIDITIS: Primary | ICD-10-CM

## 2018-10-05 PROCEDURE — 99212 OFFICE O/P EST SF 10 MIN: CPT | Performed by: INTERNAL MEDICINE

## 2018-10-05 PROCEDURE — 99214 OFFICE O/P EST MOD 30 MIN: CPT | Performed by: INTERNAL MEDICINE

## 2018-10-05 RX ORDER — LIOTHYRONINE SODIUM 5 UG/1
5 TABLET ORAL DAILY
Qty: 30 TABLET | Refills: 6 | Status: SHIPPED | OUTPATIENT
Start: 2018-10-05 | End: 2019-05-04

## 2018-10-05 NOTE — PROGRESS NOTES
Name: Randee Wray  Date: 10/5/2018    Referring Physician: Kellee Singer    Patient presents with:  Checkup: Thyroid      HISTORY OF PRESENT ILLNESS   Randee Wray is a 34year old female who presents for hypothyroidism.      33 y/o F presents for follow mouth nightly as needed for Sleep., Disp: 10 tablet, Rfl: 0  •  Fexofenadine HCl (ALLEGRA ALLERGY OR), Take 1 tablet by mouth daily.   , Disp: , Rfl:   •  ALPRAZolam (XANAX) 0.25 MG Oral Tab, Take 1 tablet (0.25 mg total) by mouth nightly as needed for [facility] pregnancy        Occupational Exposure: Yes          Works in [address] [place] [address]: Not Asked        Sleep Concern: No        Stress Concern: Not Asked        Weight Concern: No        Special Diet: No        Back Care: Not Asked        Exercise in metabolism  -Discussed gut microbiome as possible change in metabolism per patient request - recommend probiotic  -Continue LT4 112mcg PO daily, brand name Synthroid  -Start Cytomel 5mcg PO daily, verbalized understanding of risks and benefits  -Repeat

## 2018-11-07 ENCOUNTER — TELEPHONE (OUTPATIENT)
Dept: INTERNAL MEDICINE CLINIC | Facility: CLINIC | Age: 38
End: 2018-11-07

## 2018-11-07 RX ORDER — AZITHROMYCIN 250 MG/1
TABLET, FILM COATED ORAL
Qty: 6 TABLET | Refills: 0 | Status: SHIPPED | OUTPATIENT
Start: 2018-11-07 | End: 2020-06-29 | Stop reason: ALTCHOICE

## 2018-11-07 NOTE — TELEPHONE ENCOUNTER
Would recommend flonase 2 sprays each nostril; steam 3-4 times daily, sleep with head of bed elevated. I sent in rx for zpak. She should call if symptoms do not improve.

## 2018-11-07 NOTE — TELEPHONE ENCOUNTER
Patient gets chronic sinus infection. Started 2 weeks ago with green chunky mucous. Plugged ears  Facial pain  No temp  Slight cough  No taste to food. Patient usually gets medications from Dr. Crystal Marsh, ENT but she has not seen him for a year.

## 2018-12-14 ENCOUNTER — OFFICE VISIT (OUTPATIENT)
Dept: OBGYN CLINIC | Facility: CLINIC | Age: 38
End: 2018-12-14

## 2018-12-14 VITALS
SYSTOLIC BLOOD PRESSURE: 127 MMHG | HEIGHT: 65.5 IN | DIASTOLIC BLOOD PRESSURE: 84 MMHG | WEIGHT: 153.38 LBS | BODY MASS INDEX: 25.25 KG/M2 | HEART RATE: 60 BPM

## 2018-12-14 DIAGNOSIS — Z01.419 WOMEN'S ANNUAL ROUTINE GYNECOLOGICAL EXAMINATION: Primary | ICD-10-CM

## 2018-12-14 DIAGNOSIS — Z23 INFLUENZA VACCINE ADMINISTERED: ICD-10-CM

## 2018-12-14 DIAGNOSIS — Z12.4 SCREENING FOR CERVICAL CANCER: ICD-10-CM

## 2018-12-14 DIAGNOSIS — Z30.431 INTRAUTERINE DEVICE SURVEILLANCE: ICD-10-CM

## 2018-12-14 PROCEDURE — 90471 IMMUNIZATION ADMIN: CPT | Performed by: ADVANCED PRACTICE MIDWIFE

## 2018-12-14 PROCEDURE — 99396 PREV VISIT EST AGE 40-64: CPT | Performed by: ADVANCED PRACTICE MIDWIFE

## 2018-12-14 PROCEDURE — 90686 IIV4 VACC NO PRSV 0.5 ML IM: CPT | Performed by: ADVANCED PRACTICE MIDWIFE

## 2018-12-14 NOTE — PROGRESS NOTES
Randee Wray is a 34year old female. HPI:   Patient presents with:  Gyn Exam: Annual and pap      Reports increase in menstrual migraines, had for 3 days last month. Takes imitrex which help, but does not like how imitrex makes her feel.  Happy with I Medications (Active prior to today's visit):    Current Outpatient Medications:  Liothyronine Sodium (CYTOMEL) 5 MCG Oral Tab Take 1 tablet (5 mcg total) by mouth daily.  Disp: 30 tablet Rfl: 6   ergocalciferol 47375 units Oral Cap Take 1 capsule (50,000 Un Neurological: Positive for headaches. Negative for dizziness, tremors, seizures, facial asymmetry, weakness and numbness. Psychiatric/Behavioral: Negative.         PHYSICAL EXAM:      12/14/18  1254   BP: 127/84   Pulse: 60     Physical Exam   Vitals revi

## 2018-12-18 ENCOUNTER — TELEPHONE (OUTPATIENT)
Dept: OBGYN CLINIC | Facility: CLINIC | Age: 38
End: 2018-12-18

## 2018-12-18 NOTE — TELEPHONE ENCOUNTER
----- Message from Veronica Hein CNM sent at 12/18/2018  1:59 PM CST -----  Please notify pap and HPV negative.  Next pap due in 5 yrs, though we recommend an annual physical. Thanks, MJ

## 2018-12-31 ENCOUNTER — LAB ENCOUNTER (OUTPATIENT)
Dept: LAB | Age: 38
End: 2018-12-31
Attending: INTERNAL MEDICINE
Payer: COMMERCIAL

## 2018-12-31 DIAGNOSIS — E03.8 HYPOTHYROIDISM DUE TO HASHIMOTO'S THYROIDITIS: ICD-10-CM

## 2018-12-31 DIAGNOSIS — E06.3 HYPOTHYROIDISM DUE TO HASHIMOTO'S THYROIDITIS: ICD-10-CM

## 2018-12-31 LAB
ALBUMIN SERPL BCP-MCNC: 4.3 G/DL (ref 3.5–4.8)
ALBUMIN/GLOB SERPL: 2 {RATIO} (ref 1–2)
ALP SERPL-CCNC: 61 U/L (ref 32–100)
ALT SERPL-CCNC: 14 U/L (ref 14–54)
ANION GAP SERPL CALC-SCNC: 6 MMOL/L (ref 0–18)
AST SERPL-CCNC: 17 U/L (ref 15–41)
BASOPHILS # BLD: 0 K/UL (ref 0–0.2)
BASOPHILS NFR BLD: 1 %
BILIRUB SERPL-MCNC: 0.6 MG/DL (ref 0.3–1.2)
BUN SERPL-MCNC: 12 MG/DL (ref 8–20)
BUN/CREAT SERPL: 17.1 (ref 10–20)
CALCIUM SERPL-MCNC: 8.9 MG/DL (ref 8.5–10.5)
CHLORIDE SERPL-SCNC: 108 MMOL/L (ref 95–110)
CHOLEST SERPL-MCNC: 193 MG/DL (ref 110–200)
CO2 SERPL-SCNC: 23 MMOL/L (ref 22–32)
CORTIS SERPL-MCNC: 12.8 MCG/DL
CREAT SERPL-MCNC: 0.7 MG/DL (ref 0.5–1.5)
EOSINOPHIL # BLD: 0.1 K/UL (ref 0–0.7)
EOSINOPHIL NFR BLD: 2 %
ERYTHROCYTE [DISTWIDTH] IN BLOOD BY AUTOMATED COUNT: 12.9 % (ref 11–15)
GLOBULIN PLAS-MCNC: 2.1 G/DL (ref 2.5–3.7)
GLUCOSE SERPL-MCNC: 100 MG/DL (ref 70–99)
HCT VFR BLD AUTO: 44.3 % (ref 35–48)
HDLC SERPL-MCNC: 49 MG/DL
HGB BLD-MCNC: 14.9 G/DL (ref 12–16)
INSULIN SERPL-ACNC: 4.82 MIU/ML (ref 1.9–23)
LDLC SERPL CALC-MCNC: 130 MG/DL (ref 0–99)
LYMPHOCYTES # BLD: 2.5 K/UL (ref 1–4)
LYMPHOCYTES NFR BLD: 38 %
MCH RBC QN AUTO: 32.1 PG (ref 27–32)
MCHC RBC AUTO-ENTMCNC: 33.5 G/DL (ref 32–37)
MCV RBC AUTO: 95.8 FL (ref 80–100)
MONOCYTES # BLD: 0.5 K/UL (ref 0–1)
MONOCYTES NFR BLD: 8 %
NEUTROPHILS # BLD AUTO: 3.4 K/UL (ref 1.8–7.7)
NEUTROPHILS NFR BLD: 52 %
NONHDLC SERPL-MCNC: 144 MG/DL
OSMOLALITY UR CALC.SUM OF ELEC: 284 MOSM/KG (ref 275–295)
PATIENT FASTING: YES
PLATELET # BLD AUTO: 193 K/UL (ref 140–400)
PMV BLD AUTO: 10.2 FL (ref 7.4–10.3)
POTASSIUM SERPL-SCNC: 3.9 MMOL/L (ref 3.3–5.1)
PROT SERPL-MCNC: 6.4 G/DL (ref 5.9–8.4)
RBC # BLD AUTO: 4.63 M/UL (ref 3.7–5.4)
SODIUM SERPL-SCNC: 137 MMOL/L (ref 136–144)
T3FREE SERPL-MCNC: 3.98 PG/ML (ref 2.53–4.29)
T4 FREE SERPL-MCNC: 0.83 NG/DL (ref 0.58–1.64)
TRIGL SERPL-MCNC: 69 MG/DL (ref 1–149)
TSH SERPL-ACNC: 0.37 UIU/ML (ref 0.45–5.33)
WBC # BLD AUTO: 6.5 K/UL (ref 4–11)

## 2018-12-31 PROCEDURE — 82533 TOTAL CORTISOL: CPT

## 2018-12-31 PROCEDURE — 84439 ASSAY OF FREE THYROXINE: CPT

## 2018-12-31 PROCEDURE — 84481 FREE ASSAY (FT-3): CPT

## 2018-12-31 PROCEDURE — 36415 COLL VENOUS BLD VENIPUNCTURE: CPT

## 2018-12-31 PROCEDURE — 85025 COMPLETE CBC W/AUTO DIFF WBC: CPT

## 2018-12-31 PROCEDURE — 84443 ASSAY THYROID STIM HORMONE: CPT

## 2018-12-31 PROCEDURE — 83525 ASSAY OF INSULIN: CPT

## 2018-12-31 PROCEDURE — 80053 COMPREHEN METABOLIC PANEL: CPT

## 2018-12-31 PROCEDURE — 80061 LIPID PANEL: CPT

## 2019-01-02 ENCOUNTER — TELEPHONE (OUTPATIENT)
Dept: ENDOCRINOLOGY CLINIC | Facility: CLINIC | Age: 39
End: 2019-01-02

## 2019-01-02 NOTE — TELEPHONE ENCOUNTER
Please call patient - good news, overall labs are stable and at goal.  Normal thyroid levels, normal insulin level and normal cortisol level. Please continue current medications. Thanks.

## 2019-03-08 RX ORDER — LEVOTHYROXINE SODIUM 112 MCG
TABLET ORAL
Qty: 30 TABLET | Refills: 5 | Status: SHIPPED | OUTPATIENT
Start: 2019-03-08 | End: 2019-09-03

## 2019-03-08 NOTE — TELEPHONE ENCOUNTER
Current Outpatient Medications:  SYNTHROID 112 MCG Oral Tab Take 1 tablet (112 mcg total) by mouth before breakfast. Disp: 90 tablet Rfl: 1     Refill

## 2019-05-04 DIAGNOSIS — E03.8 HYPOTHYROIDISM DUE TO HASHIMOTO'S THYROIDITIS: ICD-10-CM

## 2019-05-04 DIAGNOSIS — E06.3 HYPOTHYROIDISM DUE TO HASHIMOTO'S THYROIDITIS: ICD-10-CM

## 2019-05-06 RX ORDER — LIOTHYRONINE SODIUM 5 UG/1
TABLET ORAL
Qty: 30 TABLET | Refills: 4 | Status: SHIPPED | OUTPATIENT
Start: 2019-05-06 | End: 2019-10-06

## 2019-05-28 ENCOUNTER — TELEPHONE (OUTPATIENT)
Dept: ENDOCRINOLOGY CLINIC | Facility: CLINIC | Age: 39
End: 2019-05-28

## 2019-05-28 DIAGNOSIS — E03.9 HYPOTHYROIDISM, UNSPECIFIED TYPE: Primary | ICD-10-CM

## 2019-05-28 NOTE — TELEPHONE ENCOUNTER
Pt states that she has noticed that her hair has been falling out and she has joint pain and also fatigue. Her father recently passed away and she is under a lot of stress and thinks that her thyroid levels are off. Please call.     OK to leave detailed m

## 2019-05-28 NOTE — TELEPHONE ENCOUNTER
C/o fatigue, hair loss, joint pain in hands and feet. Current Synthroid 112 mcg Cytomel 5 mcg. States stress may be affecting her thyroid levels and she would like to have them checked. Pt aware SH out of clinic today.     Please advise -Thyroid labs pended

## 2019-05-29 NOTE — TELEPHONE ENCOUNTER
Pt made aware ok to have labs - already placed in system by [facility].  Pt verbalized understanding

## 2019-06-26 ENCOUNTER — APPOINTMENT (OUTPATIENT)
Dept: LAB | Age: 39
End: 2019-06-26
Attending: INTERNAL MEDICINE
Payer: COMMERCIAL

## 2019-06-26 DIAGNOSIS — E03.9 HYPOTHYROIDISM, UNSPECIFIED TYPE: ICD-10-CM

## 2019-06-26 PROCEDURE — 84443 ASSAY THYROID STIM HORMONE: CPT

## 2019-06-26 PROCEDURE — 36415 COLL VENOUS BLD VENIPUNCTURE: CPT

## 2019-06-26 PROCEDURE — 84439 ASSAY OF FREE THYROXINE: CPT

## 2019-09-04 RX ORDER — LEVOTHYROXINE SODIUM 112 MCG
TABLET ORAL
Qty: 30 TABLET | Refills: 0 | Status: SHIPPED | OUTPATIENT
Start: 2019-09-04 | End: 2019-10-06

## 2019-10-06 DIAGNOSIS — E06.3 HYPOTHYROIDISM DUE TO HASHIMOTO'S THYROIDITIS: ICD-10-CM

## 2019-10-06 DIAGNOSIS — E03.8 HYPOTHYROIDISM DUE TO HASHIMOTO'S THYROIDITIS: ICD-10-CM

## 2019-10-07 RX ORDER — LIOTHYRONINE SODIUM 5 UG/1
TABLET ORAL
Qty: 30 TABLET | Refills: 0 | Status: SHIPPED | OUTPATIENT
Start: 2019-10-07 | End: 2019-11-08

## 2019-10-07 RX ORDER — LEVOTHYROXINE SODIUM 112 MCG
TABLET ORAL
Qty: 30 TABLET | Refills: 0 | Status: SHIPPED | OUTPATIENT
Start: 2019-10-07 | End: 2019-11-08

## 2019-11-08 DIAGNOSIS — E03.8 HYPOTHYROIDISM DUE TO HASHIMOTO'S THYROIDITIS: ICD-10-CM

## 2019-11-08 DIAGNOSIS — E06.3 HYPOTHYROIDISM DUE TO HASHIMOTO'S THYROIDITIS: ICD-10-CM

## 2019-11-08 RX ORDER — LIOTHYRONINE SODIUM 5 UG/1
TABLET ORAL
Qty: 30 TABLET | Refills: 0 | Status: SHIPPED | OUTPATIENT
Start: 2019-11-08 | End: 2019-12-08

## 2019-11-08 RX ORDER — LEVOTHYROXINE SODIUM 112 MCG
TABLET ORAL
Qty: 30 TABLET | Refills: 0 | Status: SHIPPED | OUTPATIENT
Start: 2019-11-08 | End: 2019-12-08

## 2019-12-08 DIAGNOSIS — E06.3 HYPOTHYROIDISM DUE TO HASHIMOTO'S THYROIDITIS: ICD-10-CM

## 2019-12-08 DIAGNOSIS — E03.8 HYPOTHYROIDISM DUE TO HASHIMOTO'S THYROIDITIS: ICD-10-CM

## 2019-12-09 RX ORDER — LIOTHYRONINE SODIUM 5 UG/1
TABLET ORAL
Qty: 30 TABLET | Refills: 0 | Status: SHIPPED | OUTPATIENT
Start: 2019-12-09 | End: 2020-01-06

## 2019-12-09 RX ORDER — LEVOTHYROXINE SODIUM 112 MCG
TABLET ORAL
Qty: 30 TABLET | Refills: 0 | Status: SHIPPED | OUTPATIENT
Start: 2019-12-09 | End: 2020-01-06

## 2020-01-04 DIAGNOSIS — E03.8 HYPOTHYROIDISM DUE TO HASHIMOTO'S THYROIDITIS: ICD-10-CM

## 2020-01-04 DIAGNOSIS — E06.3 HYPOTHYROIDISM DUE TO HASHIMOTO'S THYROIDITIS: ICD-10-CM

## 2020-01-06 RX ORDER — LEVOTHYROXINE SODIUM 112 MCG
TABLET ORAL
Qty: 30 TABLET | Refills: 0 | Status: SHIPPED | OUTPATIENT
Start: 2020-01-06 | End: 2020-02-06

## 2020-01-06 RX ORDER — LIOTHYRONINE SODIUM 5 UG/1
TABLET ORAL
Qty: 30 TABLET | Refills: 0 | Status: SHIPPED | OUTPATIENT
Start: 2020-01-06 | End: 2020-02-06

## 2020-02-05 ENCOUNTER — TELEPHONE (OUTPATIENT)
Dept: ENDOCRINOLOGY CLINIC | Facility: CLINIC | Age: 40
End: 2020-02-05

## 2020-02-05 DIAGNOSIS — E03.8 HYPOTHYROIDISM DUE TO HASHIMOTO'S THYROIDITIS: Primary | ICD-10-CM

## 2020-02-05 DIAGNOSIS — E06.3 HYPOTHYROIDISM DUE TO HASHIMOTO'S THYROIDITIS: Primary | ICD-10-CM

## 2020-02-05 NOTE — TELEPHONE ENCOUNTER
Pt. requesting to get order entered to get her labs done before her f/up appt. Pt. States that she has not questions or concerns.

## 2020-02-05 NOTE — TELEPHONE ENCOUNTER
Dr. Ирина Naqvi,     Please advise on orders. LOV: 10/05/18  Thank you.     Future Appointments   Date Time Provider Tay Matias   5/8/2020  8:15 AM Marco A Shultz MD [facility]

## 2020-02-06 DIAGNOSIS — E03.8 HYPOTHYROIDISM DUE TO HASHIMOTO'S THYROIDITIS: ICD-10-CM

## 2020-02-06 DIAGNOSIS — E06.3 HYPOTHYROIDISM DUE TO HASHIMOTO'S THYROIDITIS: ICD-10-CM

## 2020-02-06 RX ORDER — LIOTHYRONINE SODIUM 5 UG/1
TABLET ORAL
Qty: 90 TABLET | Refills: 0 | Status: SHIPPED | OUTPATIENT
Start: 2020-02-06 | End: 2021-12-21

## 2020-02-06 RX ORDER — LEVOTHYROXINE SODIUM 112 MCG
TABLET ORAL
Qty: 90 TABLET | Refills: 0 | Status: SHIPPED | OUTPATIENT
Start: 2020-02-06 | End: 2021-12-21

## 2020-02-06 NOTE — TELEPHONE ENCOUNTER
•  LIOTHYRONINE SODIUM 5 MCG Oral Tab, TAKE 1 TABLET(5 MCG) BY MOUTH DAILY, Disp: 30 tablet, Rfl: 0    •  SYNTHROID 112 MCG Oral Tab, TAKE 1 TABLET BY MOUTH EVERY DAY BEFORE BREAKFAST, Disp: 30 tablet, Rfl: 0    REFILL

## 2020-02-06 NOTE — TELEPHONE ENCOUNTER
LOV: 10/05/18  LR on both meds: 01/06/20    Ok to send 90 days to last her until appt below? Pended RX for your review.     Future Appointments   Date Time Provider Tay Matias   5/8/2020  8:15 AM Ludwin Campos MD [facility]

## 2020-06-29 ENCOUNTER — OFFICE VISIT (OUTPATIENT)
Dept: INTERNAL MEDICINE CLINIC | Facility: CLINIC | Age: 40
End: 2020-06-29
Payer: COMMERCIAL

## 2020-06-29 VITALS
DIASTOLIC BLOOD PRESSURE: 70 MMHG | SYSTOLIC BLOOD PRESSURE: 106 MMHG | BODY MASS INDEX: 23.99 KG/M2 | OXYGEN SATURATION: 99 % | WEIGHT: 144 LBS | HEIGHT: 65 IN | HEART RATE: 74 BPM | TEMPERATURE: 99 F

## 2020-06-29 DIAGNOSIS — Z00.00 ANNUAL PHYSICAL EXAM: Primary | ICD-10-CM

## 2020-06-29 DIAGNOSIS — B35.0 FUNGAL SCALP INFECTION: ICD-10-CM

## 2020-06-29 DIAGNOSIS — R92.2 DENSE BREAST: ICD-10-CM

## 2020-06-29 DIAGNOSIS — B35.0 TINEA CAPITIS: ICD-10-CM

## 2020-06-29 DIAGNOSIS — Z12.31 ENCOUNTER FOR SCREENING MAMMOGRAM FOR MALIGNANT NEOPLASM OF BREAST: ICD-10-CM

## 2020-06-29 DIAGNOSIS — E06.3 HASHIMOTO'S THYROIDITIS: ICD-10-CM

## 2020-06-29 DIAGNOSIS — G43.809 OTHER MIGRAINE WITHOUT STATUS MIGRAINOSUS, NOT INTRACTABLE: ICD-10-CM

## 2020-06-29 PROCEDURE — 99396 PREV VISIT EST AGE 40-64: CPT | Performed by: INTERNAL MEDICINE

## 2020-06-29 RX ORDER — ALPRAZOLAM 0.5 MG/1
0.5 TABLET ORAL NIGHTLY PRN
Qty: 10 TABLET | Refills: 0 | Status: SHIPPED | OUTPATIENT
Start: 2020-06-29 | End: 2021-06-14

## 2020-06-29 RX ORDER — KETOCONAZOLE 20 MG/ML
5 SHAMPOO TOPICAL
Qty: 120 ML | Refills: 5 | Status: SHIPPED | OUTPATIENT
Start: 2020-06-29

## 2020-06-29 RX ORDER — FLUTICASONE PROPIONATE 50 MCG
SPRAY, SUSPENSION (ML) NASAL DAILY
COMMUNITY

## 2020-06-29 NOTE — PROGRESS NOTES
REASON FOR VISIT:    Elisa Awan is a 37year old female who presents for an [address]. Her past medical history includes hashimoto's hypothyroidism, amenorrhea.    She was diagnosed with Hashimoto's in 2008, and has been struggling wi pressure or other  risk factors GLYCOHEMOGLOBIN (HgA1c) (L) (%)   Date Value   10/29/2014 4.9     Glucose (mg/dL)   Date Value   12/31/2018 100 (H)     GLUCOSE (P) (mg/dL)   Date Value   08/29/2014 91        Gonorrhea Screening If high risk No results foun total) by mouth once a week. During the winter months (Patient not taking: Reported on 6/29/2020 ) 12 capsule 3      MEDICAL INFORMATION:   Past Medical History:   Diagnosis Date   • Amenorrhea     Pt reports menses have always been irregular.    • Decorati (37 °C)   Ht 5' 5\" (1.651 m)   Wt 144 lb (65.3 kg)   SpO2 99%   BMI 23.96 kg/m²   > BP Readings from Last 3 Encounters:  06/29/20 : 106/70  12/14/18 : 127/84  10/05/18 : 118/76    No LMP recorded. (Menstrual status: IUD - Intrauterine Device).     GENERAL: preventive care reminders to display for this patient. Tdap: There are no preventive care reminders to display for this patient.   Shingles:     Influenza Annually   Pneumococcal if high risk   Td/Tdap once then every 10 years   HPV Males 11-21   Zoster (S

## 2020-07-02 ENCOUNTER — PATIENT MESSAGE (OUTPATIENT)
Dept: INTERNAL MEDICINE CLINIC | Facility: CLINIC | Age: 40
End: 2020-07-02

## 2020-07-02 DIAGNOSIS — G43.809 OTHER MIGRAINE WITHOUT STATUS MIGRAINOSUS, NOT INTRACTABLE: ICD-10-CM

## 2020-07-06 RX ORDER — SUMATRIPTAN 50 MG/1
TABLET, FILM COATED ORAL
Qty: 9 TABLET | Refills: 0 | Status: SHIPPED | OUTPATIENT
Start: 2020-07-06

## 2020-07-06 RX ORDER — FLUOCINOLONE ACETONIDE 0.25 MG/G
5 OINTMENT TOPICAL 2 TIMES DAILY PRN
Qty: 60 G | Refills: 3 | Status: SHIPPED | OUTPATIENT
Start: 2020-07-06 | End: 2020-07-20

## 2020-07-20 ENCOUNTER — PATIENT MESSAGE (OUTPATIENT)
Dept: INTERNAL MEDICINE CLINIC | Facility: CLINIC | Age: 40
End: 2020-07-20

## 2020-07-20 RX ORDER — FLUOCINOLONE ACETONIDE 0.1 MG/ML
1 SOLUTION TOPICAL 2 TIMES DAILY
Qty: 90 ML | Refills: 3 | Status: SHIPPED | OUTPATIENT
Start: 2020-07-20

## 2020-07-20 NOTE — TELEPHONE ENCOUNTER
From: Dwight Olmstead  To: Ronald Edouard DO  Sent: 7/20/2020 7:50 AM CDT  Subject: Prescription Question    Good morning Dr. Mariya Mendiola been using the ointment for the patch of dermatitis on my scalp, it’s making my hair greasy and is difficult to apply wi

## 2020-08-03 ENCOUNTER — HOSPITAL ENCOUNTER (OUTPATIENT)
Dept: MAMMOGRAPHY | Age: 40
Discharge: HOME | End: 2020-08-03
Attending: INTERNAL MEDICINE
Payer: COMMERCIAL

## 2020-08-03 DIAGNOSIS — R92.2 DENSE BREAST: ICD-10-CM

## 2020-08-03 DIAGNOSIS — Z12.31 ENCOUNTER FOR SCREENING MAMMOGRAM FOR MALIGNANT NEOPLASM OF BREAST: ICD-10-CM

## 2020-08-03 PROCEDURE — 77063 BREAST TOMOSYNTHESIS BI: CPT | Performed by: INTERNAL MEDICINE

## 2020-08-03 PROCEDURE — 77067 SCR MAMMO BI INCL CAD: CPT | Performed by: INTERNAL MEDICINE

## 2020-08-13 ENCOUNTER — HOSPITAL ENCOUNTER (OUTPATIENT)
Dept: MAMMOGRAPHY | Facility: HOSPITAL | Age: 40
Discharge: HOME | End: 2020-08-13
Attending: INTERNAL MEDICINE
Payer: COMMERCIAL

## 2020-08-13 ENCOUNTER — TELEPHONE (OUTPATIENT)
Dept: INTERNAL MEDICINE CLINIC | Facility: CLINIC | Age: 40
End: 2020-08-13

## 2020-08-13 ENCOUNTER — HOSPITAL ENCOUNTER (OUTPATIENT)
Dept: ULTRASOUND IMAGING | Facility: HOSPITAL | Age: 40
Discharge: HOME | End: 2020-08-13
Attending: INTERNAL MEDICINE
Payer: COMMERCIAL

## 2020-08-13 DIAGNOSIS — N63.20 LEFT BREAST MASS: Primary | ICD-10-CM

## 2020-08-13 DIAGNOSIS — R92.8 ABNORMAL MAMMOGRAM: ICD-10-CM

## 2020-08-13 PROCEDURE — 77065 DX MAMMO INCL CAD UNI: CPT | Performed by: INTERNAL MEDICINE

## 2020-08-13 PROCEDURE — 76642 ULTRASOUND BREAST LIMITED: CPT | Performed by: INTERNAL MEDICINE

## 2020-08-13 PROCEDURE — 77061 BREAST TOMOSYNTHESIS UNI: CPT | Performed by: INTERNAL MEDICINE

## 2020-08-13 NOTE — IMAGING NOTE
This nurse introduced self and role of breast coordinator. Discussed recommended breast biopsy with patient. Pt was recommended by Dr Mariola Finch  to have a left breast ultrasound guided biopsy. Mrs Ender Diez had baseline mammogram last week was called back. She CATEGORY 4A--SUSPICIOUS       RECOMMENDATIONS:   ULTRASOUND-GUIDED CORE BIOPSY: LEFT BREAST       Results were called to the office of Dr. Lidia Casey. Results were discussed with the patient.                     Dictated by (CST): Sudha Pimentel MD on 8/13/2020 herbal supplements, as follows:   -All herbal supplements, Vitamin E, Fish Oil  green tea ,all nsaids like   ibuprofen, Motrin, Advil, Aleve, or other anti-inflammatory medication)   and aspirin 81 mg to be held for 5 days prior to biopsy She uses omega la biopsy. Discussed use of a supportive bra and ice packs after procedure, to decrease soreness. Tylenol only for discomfort unless they have an allergy to tylenol .   Discussed with patient no swimming, bathing,  hot tubs or submerging underwater  for 5 da

## 2020-08-13 NOTE — TELEPHONE ENCOUNTER
To Dr. Edwige Cardoso, on call -  [facility]: L breast mass probably benign - recommend needle asp under US. Dr. La Chao will talk to pt and schedule. See pended - is this correct?

## 2020-08-18 ENCOUNTER — HOSPITAL ENCOUNTER (OUTPATIENT)
Dept: ULTRASOUND IMAGING | Facility: HOSPITAL | Age: 40
Discharge: HOME | End: 2020-08-18
Attending: INTERNAL MEDICINE
Payer: COMMERCIAL

## 2020-08-18 ENCOUNTER — HOSPITAL ENCOUNTER (OUTPATIENT)
Dept: MAMMOGRAPHY | Facility: HOSPITAL | Age: 40
Discharge: HOME | End: 2020-08-18
Attending: INTERNAL MEDICINE
Payer: COMMERCIAL

## 2020-08-18 VITALS — HEART RATE: 71 BPM | SYSTOLIC BLOOD PRESSURE: 106 MMHG | DIASTOLIC BLOOD PRESSURE: 61 MMHG

## 2020-08-18 DIAGNOSIS — N63.20 LEFT BREAST MASS: ICD-10-CM

## 2020-08-18 PROCEDURE — 88305 TISSUE EXAM BY PATHOLOGIST: CPT | Performed by: INTERNAL MEDICINE

## 2020-08-18 PROCEDURE — 19083 BX BREAST 1ST LESION US IMAG: CPT | Performed by: INTERNAL MEDICINE

## 2020-08-18 PROCEDURE — 77065 DX MAMMO INCL CAD UNI: CPT | Performed by: INTERNAL MEDICINE

## 2020-08-18 NOTE — PROCEDURES
[facility] - [facility]  Procedure Note    Elisa Awan Patient Status:  Outpatient    1980 MRN [id number]   Location [address] Attending Miki Ibrahim MD   Hosp Day # 0 PCP Rose Baer DO     Procedu

## 2020-08-19 ENCOUNTER — TELEPHONE (OUTPATIENT)
Dept: INTERNAL MEDICINE CLINIC | Facility: CLINIC | Age: 40
End: 2020-08-19

## 2020-08-19 NOTE — IMAGING NOTE
1150:  Contacted Mrs. Bonnie Dhillon post ultrasound guided left breast biopsy. Post biopsy care and instruction reinforced. Mrs. Bonnie Dhillon without questions or concerns at this time.      Pathology results and Dr. Ritesh Cramer recommendations shared as follows:

## 2020-08-27 ENCOUNTER — TELEPHONE (OUTPATIENT)
Dept: OBGYN CLINIC | Facility: CLINIC | Age: 40
End: 2020-08-27

## 2020-08-27 NOTE — TELEPHONE ENCOUNTER
Patient requesting to have mirena removed and asking if it can be put in the same day. Please call [phone], ok to leave message, thanks.

## 2020-09-14 ENCOUNTER — OFFICE VISIT (OUTPATIENT)
Dept: OBGYN CLINIC | Facility: CLINIC | Age: 40
End: 2020-09-14
Payer: COMMERCIAL

## 2020-09-14 VITALS
WEIGHT: 149.38 LBS | HEART RATE: 59 BPM | DIASTOLIC BLOOD PRESSURE: 87 MMHG | SYSTOLIC BLOOD PRESSURE: 126 MMHG | BODY MASS INDEX: 24.89 KG/M2 | HEIGHT: 65 IN

## 2020-09-14 DIAGNOSIS — R10.2 LEFT ADNEXAL TENDERNESS: ICD-10-CM

## 2020-09-14 DIAGNOSIS — Z30.433 ENCOUNTER FOR REMOVAL AND REINSERTION OF INTRAUTERINE CONTRACEPTIVE DEVICE: Primary | ICD-10-CM

## 2020-09-14 DIAGNOSIS — Z30.433 ENCOUNTER FOR IUD REMOVAL AND REINSERTION: ICD-10-CM

## 2020-09-14 LAB
CONTROL LINE PRESENT WITH A CLEAR BACKGROUND (YES/NO): YES YES/NO
KIT LOT #: NORMAL NUMERIC
PREGNANCY TEST, URINE: NEGATIVE

## 2020-09-14 PROCEDURE — 58300 INSERT INTRAUTERINE DEVICE: CPT | Performed by: ADVANCED PRACTICE MIDWIFE

## 2020-09-14 PROCEDURE — 3074F SYST BP LT 130 MM HG: CPT | Performed by: ADVANCED PRACTICE MIDWIFE

## 2020-09-14 PROCEDURE — 81025 URINE PREGNANCY TEST: CPT | Performed by: ADVANCED PRACTICE MIDWIFE

## 2020-09-14 PROCEDURE — 3008F BODY MASS INDEX DOCD: CPT | Performed by: ADVANCED PRACTICE MIDWIFE

## 2020-09-14 PROCEDURE — 58301 REMOVE INTRAUTERINE DEVICE: CPT | Performed by: ADVANCED PRACTICE MIDWIFE

## 2020-09-14 PROCEDURE — 3079F DIAST BP 80-89 MM HG: CPT | Performed by: ADVANCED PRACTICE MIDWIFE

## 2020-09-14 NOTE — PROCEDURES
IUD Insertion     Pregnancy Results: negative from urine test   Mirena in place  Consent signed. Procedure discussed with the patient in detail including indication, risks, benefits, alternatives and complications.     Pelvic Exam Findings:  Uterus non-ten

## 2020-10-08 ENCOUNTER — TELEPHONE (OUTPATIENT)
Dept: OBGYN CLINIC | Facility: CLINIC | Age: 40
End: 2020-10-08

## 2020-10-08 NOTE — TELEPHONE ENCOUNTER
Lmtcb. Will speak to MBW today after 3p to see if she can change it. Otherwise offer pt to try [address] [facility] which is self pay but pelvic u/s is $200. May be able to get in for appt asap w/ [facility] & cost is less.

## 2020-10-08 NOTE — TELEPHONE ENCOUNTER
Pt has appt w/ [facility] tomorrow. req orders to be faxed to [phone] & [phone]. Advised pt of fax # to send results. Orders faxed.  Pt verbalized an understanding & agrees w/ plan

## 2020-10-08 NOTE — TELEPHONE ENCOUNTER
Advised pt options for u/s. Pt desires info for [facility] & will contact office if wants us to fax order.  Pt verbalized an understanding & agrees w/ plan

## 2020-10-08 NOTE — TELEPHONE ENCOUNTER
Patient indicates U.S. pelvis is scheduling out 2-3 weeks, patient requesting stat order to get in during ovulation, please update [phone], ok to leave message, thanks.

## 2020-10-14 ENCOUNTER — TELEPHONE (OUTPATIENT)
Dept: OBGYN CLINIC | Facility: CLINIC | Age: 40
End: 2020-10-14

## 2020-10-14 NOTE — TELEPHONE ENCOUNTER
Results not received from [facility]. Advised pt I will call [facility] & will have cnm review once it has been faxed. We will contact pt once reviewed. Pt to call office back if has not heard from us by the end of the week.  Pt verbalized an understand

## 2020-10-17 NOTE — TELEPHONE ENCOUNTER
IUd in place per ultrasound. Otherwise normal ultrasound results. Small 1cm simple ovarian cyst on the right. PLease call to inform.

## 2020-10-17 NOTE — TELEPHONE ENCOUNTER
Pt advised of U/S results per MBW. Pt states she is still having pain with ovulation and would like to know what she can do. Message routed to [facility] for rec's.

## 2020-10-19 NOTE — TELEPHONE ENCOUNTER
Ibuprofen and warm packs to the area typically work well for this type of pain. I recommend keeping a log of the timing of discomfort.

## 2020-10-23 ENCOUNTER — TELEPHONE (OUTPATIENT)
Dept: INTERNAL MEDICINE CLINIC | Facility: CLINIC | Age: 40
End: 2020-10-23

## 2020-10-23 ENCOUNTER — APPOINTMENT (OUTPATIENT)
Dept: LAB | Facility: HOSPITAL | Age: 40
End: 2020-10-23
Attending: INTERNAL MEDICINE
Payer: COMMERCIAL

## 2020-10-23 DIAGNOSIS — Z20.822 EXPOSURE TO COVID-19 VIRUS: ICD-10-CM

## 2020-10-23 DIAGNOSIS — R51.9 ACUTE NONINTRACTABLE HEADACHE, UNSPECIFIED HEADACHE TYPE: Primary | ICD-10-CM

## 2020-10-23 DIAGNOSIS — R51.9 ACUTE NONINTRACTABLE HEADACHE, UNSPECIFIED HEADACHE TYPE: ICD-10-CM

## 2020-10-23 NOTE — TELEPHONE ENCOUNTER
Please notify patient that with that history and with the contact greater than 6 feet with door open it would be reasonable not to do a Covid test.  However with that said it is also not unreasonable since there are no masks to be in 1 in the 6 feet rule i

## 2020-10-23 NOTE — TELEPHONE ENCOUNTER
To Dr. Borges Notice-----    Pt is calling to report + COVID exposure on 10/20 by a client. States client did not have symptoms. Reports no masks, 10-11 feet away, door open for 1 hour. Pt reports she does not believe she has symptoms.  States she does have HA,

## 2020-10-23 NOTE — TELEPHONE ENCOUNTER
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

## 2020-10-26 NOTE — TELEPHONE ENCOUNTER
Please notify patient that her Covid test came out nondetected.   Please ask her how her symptoms are

## 2020-10-26 NOTE — TELEPHONE ENCOUNTER
LM for pt to call back, also sent covid results and MD message via [facility] [employer]. Advised pt to call back with any questions or concerns.

## 2020-11-17 ENCOUNTER — TELEPHONE (OUTPATIENT)
Dept: INTERNAL MEDICINE CLINIC | Facility: CLINIC | Age: 40
End: 2020-11-17

## 2020-11-17 DIAGNOSIS — Z20.822 ENCOUNTER FOR SCREENING LABORATORY TESTING FOR COVID-19 VIRUS: Primary | ICD-10-CM

## 2020-11-17 NOTE — TELEPHONE ENCOUNTER
Relayed MD's message to patient---verbalized understanding  Provided pt with central scheduling number to call and schedule COVID test

## 2020-11-17 NOTE — TELEPHONE ENCOUNTER
Patient is eligible for nasopharyngeal swab, symptomatic Covid test.  Please notify patient she will be contacted by central scheduling for appropriate time and location.     Other recommendations:    Recommend that the patient continue to check temperature

## 2020-11-17 NOTE — TELEPHONE ENCOUNTER
Patient is calling her spouse has been sick with congestion & no fever    Yesterday patient had a fever of 100, she was very tired  When she woke this morning she has a dampened sense of taste  No fever this morning just fatigue    She has no known exposur

## 2020-11-17 NOTE — TELEPHONE ENCOUNTER
To Dr. Nitza Barragan - see pt message below. Pt states she does have allergies and a constant post nasal drip normally.       Respiratory infection triage:    Fever:  []  No fever - highest was 100 last night  []  Fever>100.4    Cough:  [] Tight cough  [] Cough w

## 2020-11-18 ENCOUNTER — APPOINTMENT (OUTPATIENT)
Dept: LAB | Age: 40
End: 2020-11-18
Attending: INTERNAL MEDICINE
Payer: COMMERCIAL

## 2020-11-18 DIAGNOSIS — Z20.822 ENCOUNTER FOR SCREENING LABORATORY TESTING FOR COVID-19 VIRUS: ICD-10-CM

## 2020-11-20 NOTE — TELEPHONE ENCOUNTER
PLease kindly notify Ms. Mally Yanez that her COVID test from 11/18 returned positive. She needs to self isolate from onset of symptoms (11/16).  Can DC self isolation after 10 days from onset of symptoms, clinical improvement, and no fevers over 24 hours witho

## 2021-06-14 ENCOUNTER — OFFICE VISIT (OUTPATIENT)
Dept: INTERNAL MEDICINE CLINIC | Facility: CLINIC | Age: 41
End: 2021-06-14
Payer: COMMERCIAL

## 2021-06-14 VITALS
SYSTOLIC BLOOD PRESSURE: 118 MMHG | TEMPERATURE: 98 F | HEART RATE: 62 BPM | DIASTOLIC BLOOD PRESSURE: 74 MMHG | WEIGHT: 158.19 LBS | HEIGHT: 65 IN | OXYGEN SATURATION: 99 % | BODY MASS INDEX: 26.35 KG/M2

## 2021-06-14 DIAGNOSIS — H57.12 PAIN OF LEFT EYE: Primary | ICD-10-CM

## 2021-06-14 DIAGNOSIS — E03.8 HYPOTHYROIDISM DUE TO HASHIMOTO'S THYROIDITIS: ICD-10-CM

## 2021-06-14 DIAGNOSIS — S05.02XD ABRASION OF LEFT CORNEA, SUBSEQUENT ENCOUNTER: ICD-10-CM

## 2021-06-14 DIAGNOSIS — E06.3 HYPOTHYROIDISM DUE TO HASHIMOTO'S THYROIDITIS: ICD-10-CM

## 2021-06-14 PROCEDURE — 99214 OFFICE O/P EST MOD 30 MIN: CPT | Performed by: INTERNAL MEDICINE

## 2021-06-14 PROCEDURE — 3008F BODY MASS INDEX DOCD: CPT | Performed by: INTERNAL MEDICINE

## 2021-06-14 PROCEDURE — 3078F DIAST BP <80 MM HG: CPT | Performed by: INTERNAL MEDICINE

## 2021-06-14 PROCEDURE — 3074F SYST BP LT 130 MM HG: CPT | Performed by: INTERNAL MEDICINE

## 2021-06-14 RX ORDER — ALPRAZOLAM 0.5 MG/1
0.5 TABLET ORAL NIGHTLY PRN
Qty: 10 TABLET | Refills: 0 | Status: SHIPPED | OUTPATIENT
Start: 2021-06-14

## 2021-06-14 RX ORDER — POLYMYXIN B SULFATE AND TRIMETHOPRIM 1; 10000 MG/ML; [USP'U]/ML
1 SOLUTION OPHTHALMIC EVERY 4 HOURS
Qty: 1 EACH | Refills: 0 | Status: SHIPPED | OUTPATIENT
Start: 2021-06-14 | End: 2021-12-21 | Stop reason: ALTCHOICE

## 2021-06-14 NOTE — PROGRESS NOTES
Chief Complaint:   Patient presents with:  Checkup: Thursday, poked left eye with eyeliner. Saturday eye tearful, light sensitive. urgent care - given erythromycin and keflex. Aleve helped with swelling/grittiness.        HPI:     Ms. Ender Diez is a 41 year o and Other Disorders Associated Mother            • Other (hypothyroid) Mother    • Other (Other) Son         Second baby dx with VSD after delivery   • Hypertension Father    • Heart Disorder Father    • Cancer Paternal Uncle         unknown type Oral Cap Take 1 capsule (50,000 Units total) by mouth once a week. During the winter months (Patient not taking: Reported on 6/29/2020 ) 12 capsule 3   • Ketoconazole 1 % External Shampoo Apply 1 Application topically daily.  (Patient not taking: Reported o Oropharynx without erythema or exudates; no erythema nor edema of bilateral upper and lower eyelids  Cardiovascular: S1, S2, no S3, no S4, Regular rate and rhythm, No murmurs/gallops/rubs. Respiratory: Clear to auscultation bilaterally.   No wheezes/rales examination if indicated by Dr. Jaquelin Zepeda  -Reasonable to stop the Keflex. She will let us know if she experiences any symptoms around her eyelids.     Hashimoto's thyroiditis  -She is currently following up with integrative medicine  –On Synthroid 112 mcg

## 2021-06-14 NOTE — PATIENT INSTRUCTIONS
You were seen in clinic for an acute visit with eye issues. Based on our assessment, this is likely due to local traumatic conjunctivitis with possible corneal abrasion (a scratch of the outer surface of the eye).   We are happy to hear that you have had i

## 2021-11-18 ENCOUNTER — APPOINTMENT (OUTPATIENT)
Dept: URBAN - METROPOLITAN AREA CLINIC 321 | Age: 41
Setting detail: DERMATOLOGY
End: 2021-11-18

## 2021-11-18 DIAGNOSIS — L21.8 OTHER SEBORRHEIC DERMATITIS: ICD-10-CM

## 2021-11-18 PROCEDURE — OTHER COUNSELING: OTHER

## 2021-11-18 PROCEDURE — OTHER PRESCRIPTION: OTHER

## 2021-11-18 PROCEDURE — 99214 OFFICE O/P EST MOD 30 MIN: CPT

## 2021-11-18 RX ORDER — KETOCONAZOLE 20 MG/ML
SHAMPOO, SUSPENSION TOPICAL
Qty: 120 | Refills: 10 | Status: ERX

## 2021-11-18 RX ORDER — FLUOCINONIDE 0.5 MG/ML
SOLUTION TOPICAL
Qty: 60 | Refills: 3 | Status: ERX

## 2021-11-18 ASSESSMENT — LOCATION SIMPLE DESCRIPTION DERM: LOCATION SIMPLE: SCALP

## 2021-11-18 ASSESSMENT — LOCATION DETAILED DESCRIPTION DERM: LOCATION DETAILED: LEFT SUPERIOR PARIETAL SCALP

## 2021-11-18 ASSESSMENT — LOCATION ZONE DERM: LOCATION ZONE: SCALP

## 2021-12-21 ENCOUNTER — APPOINTMENT (OUTPATIENT)
Dept: GENERAL RADIOLOGY | Facility: HOSPITAL | Age: 41
End: 2021-12-21
Attending: INTERNAL MEDICINE
Payer: COMMERCIAL

## 2021-12-21 ENCOUNTER — TELEPHONE (OUTPATIENT)
Dept: INTERNAL MEDICINE CLINIC | Facility: CLINIC | Age: 41
End: 2021-12-21

## 2021-12-21 ENCOUNTER — HOSPITAL ENCOUNTER (OUTPATIENT)
Dept: GENERAL RADIOLOGY | Facility: HOSPITAL | Age: 41
Discharge: HOME | End: 2021-12-21
Attending: INTERNAL MEDICINE
Payer: COMMERCIAL

## 2021-12-21 ENCOUNTER — OFFICE VISIT (OUTPATIENT)
Dept: INTERNAL MEDICINE CLINIC | Facility: CLINIC | Age: 41
End: 2021-12-21
Payer: COMMERCIAL

## 2021-12-21 ENCOUNTER — LAB ENCOUNTER (OUTPATIENT)
Dept: LAB | Age: 41
End: 2021-12-21
Attending: INTERNAL MEDICINE
Payer: COMMERCIAL

## 2021-12-21 VITALS
OXYGEN SATURATION: 99 % | HEIGHT: 65 IN | WEIGHT: 158 LBS | BODY MASS INDEX: 26.33 KG/M2 | TEMPERATURE: 98 F | SYSTOLIC BLOOD PRESSURE: 102 MMHG | HEART RATE: 63 BPM | DIASTOLIC BLOOD PRESSURE: 70 MMHG

## 2021-12-21 DIAGNOSIS — M79.89 SWELLING OF BOTH HANDS: ICD-10-CM

## 2021-12-21 DIAGNOSIS — M79.89 SWELLING OF BOTH HANDS: Primary | ICD-10-CM

## 2021-12-21 DIAGNOSIS — Z12.31 ENCOUNTER FOR SCREENING MAMMOGRAM FOR MALIGNANT NEOPLASM OF BREAST: ICD-10-CM

## 2021-12-21 DIAGNOSIS — R20.2 PARESTHESIA: ICD-10-CM

## 2021-12-21 DIAGNOSIS — Z00.00 ANNUAL PHYSICAL EXAM: ICD-10-CM

## 2021-12-21 DIAGNOSIS — Z00.00 ANNUAL PHYSICAL EXAM: Primary | ICD-10-CM

## 2021-12-21 DIAGNOSIS — R92.2 DENSE BREAST: ICD-10-CM

## 2021-12-21 PROCEDURE — 85025 COMPLETE CBC W/AUTO DIFF WBC: CPT

## 2021-12-21 PROCEDURE — 80053 COMPREHEN METABOLIC PANEL: CPT

## 2021-12-21 PROCEDURE — 3008F BODY MASS INDEX DOCD: CPT | Performed by: INTERNAL MEDICINE

## 2021-12-21 PROCEDURE — 3074F SYST BP LT 130 MM HG: CPT | Performed by: INTERNAL MEDICINE

## 2021-12-21 PROCEDURE — 86431 RHEUMATOID FACTOR QUANT: CPT

## 2021-12-21 PROCEDURE — 99213 OFFICE O/P EST LOW 20 MIN: CPT | Performed by: INTERNAL MEDICINE

## 2021-12-21 PROCEDURE — 3078F DIAST BP <80 MM HG: CPT | Performed by: INTERNAL MEDICINE

## 2021-12-21 PROCEDURE — 73130 X-RAY EXAM OF HAND: CPT | Performed by: INTERNAL MEDICINE

## 2021-12-21 PROCEDURE — 85652 RBC SED RATE AUTOMATED: CPT

## 2021-12-21 PROCEDURE — 36415 COLL VENOUS BLD VENIPUNCTURE: CPT

## 2021-12-21 PROCEDURE — 80061 LIPID PANEL: CPT

## 2021-12-21 PROCEDURE — 82607 VITAMIN B-12: CPT

## 2021-12-21 PROCEDURE — 86038 ANTINUCLEAR ANTIBODIES: CPT

## 2021-12-21 RX ORDER — LEVOTHYROXINE, LIOTHYRONINE 9.5; 2.25 UG/1; UG/1
15 TABLET ORAL DAILY
COMMUNITY
Start: 2021-11-24

## 2021-12-21 RX ORDER — LEVOTHYROXINE, LIOTHYRONINE 38; 9 UG/1; UG/1
60 TABLET ORAL DAILY
COMMUNITY
Start: 2021-11-23

## 2021-12-21 NOTE — PROGRESS NOTES
Neha Haddad is a 35year old female. Patient presents with:  Swelling: onset: 1.5 weeks. swelling of fingers. c/o numbness and tingling. has swelling to feet towards the end of day.  Family h/o RA      HPI:   Neha Haddad is a 35year old female who pre capsule by mouth daily. Past Medical History:   Diagnosis Date   • Amenorrhea     Pt reports menses have always been irregular.    • Decorative tattoo    • History of chicken pox in Childhood   • Hypothyroid 2008    Medication   • Hypothyroidism    • discoloration.      ASSESSMENT AND PLAN:     1. Joint swelling  Check xrays as well as labs to check for AI etiology  Discussed eating healthier diet-consider turmeric    Health maintenance  Advised mammogram and fasting blood work      Bert Lemus DO  12/2

## 2022-02-24 ENCOUNTER — PATIENT MESSAGE (OUTPATIENT)
Dept: INTERNAL MEDICINE CLINIC | Facility: CLINIC | Age: 42
End: 2022-02-24

## 2022-04-11 ENCOUNTER — HOSPITAL ENCOUNTER (OUTPATIENT)
Dept: MAMMOGRAPHY | Facility: HOSPITAL | Age: 42
Discharge: HOME | End: 2022-04-11
Attending: INTERNAL MEDICINE
Payer: COMMERCIAL

## 2022-04-11 DIAGNOSIS — R92.2 DENSE BREAST: ICD-10-CM

## 2022-04-11 DIAGNOSIS — Z12.31 ENCOUNTER FOR SCREENING MAMMOGRAM FOR MALIGNANT NEOPLASM OF BREAST: ICD-10-CM

## 2022-04-11 PROCEDURE — 77067 SCR MAMMO BI INCL CAD: CPT | Performed by: INTERNAL MEDICINE

## 2022-04-11 PROCEDURE — 77063 BREAST TOMOSYNTHESIS BI: CPT | Performed by: INTERNAL MEDICINE

## 2022-05-28 ENCOUNTER — WALK IN (OUTPATIENT)
Dept: URGENT CARE | Age: 42
End: 2022-05-28

## 2022-05-28 VITALS
HEIGHT: 65 IN | OXYGEN SATURATION: 98 % | RESPIRATION RATE: 16 BRPM | HEART RATE: 80 BPM | WEIGHT: 157 LBS | TEMPERATURE: 98.2 F | BODY MASS INDEX: 26.16 KG/M2

## 2022-05-28 DIAGNOSIS — B96.89 ACUTE BACTERIAL SINUSITIS: Primary | ICD-10-CM

## 2022-05-28 DIAGNOSIS — J01.90 ACUTE BACTERIAL SINUSITIS: Primary | ICD-10-CM

## 2022-05-28 PROBLEM — J33.9 NASAL POLYPS: Status: ACTIVE | Noted: 2022-05-28

## 2022-05-28 PROCEDURE — 99203 OFFICE O/P NEW LOW 30 MIN: CPT | Performed by: NURSE PRACTITIONER

## 2022-05-28 RX ORDER — AMOXICILLIN AND CLAVULANATE POTASSIUM 875; 125 MG/1; MG/1
1 TABLET, FILM COATED ORAL EVERY 12 HOURS
Qty: 14 TABLET | Refills: 0 | Status: SHIPPED | OUTPATIENT
Start: 2022-05-28 | End: 2022-06-04

## 2022-05-28 RX ORDER — FLUTICASONE PROPIONATE 50 MCG
SPRAY, SUSPENSION (ML) NASAL
COMMUNITY

## 2022-05-28 RX ORDER — FLUCONAZOLE 150 MG/1
150 TABLET ORAL ONCE
Qty: 1 TABLET | Refills: 0 | Status: SHIPPED | OUTPATIENT
Start: 2022-05-28 | End: 2022-05-28

## 2022-05-28 RX ORDER — LEVOTHYROXINE, LIOTHYRONINE 38; 9 UG/1; UG/1
60 TABLET ORAL DAILY
COMMUNITY
Start: 2022-04-03

## 2022-05-28 RX ORDER — LEVOTHYROXINE, LIOTHYRONINE 9.5; 2.25 UG/1; UG/1
15 TABLET ORAL DAILY
COMMUNITY
Start: 2022-04-03

## 2022-05-28 RX ORDER — GARLIC EXTRACT 500 MG
1 CAPSULE ORAL DAILY
COMMUNITY

## 2022-05-28 RX ORDER — DEXTROAMPHETAMINE SULFATE, DEXTROAMPHETAMINE SACCHARATE, AMPHETAMINE SULFATE AND AMPHETAMINE ASPARTATE 5; 5; 5; 5 MG/1; MG/1; MG/1; MG/1
CAPSULE, EXTENDED RELEASE ORAL DAILY
COMMUNITY
Start: 2022-05-20

## 2022-05-28 RX ORDER — SUMATRIPTAN 100 MG/1
TABLET, FILM COATED ORAL
COMMUNITY

## 2022-05-28 RX ORDER — FEXOFENADINE HCL 180 MG/1
1 TABLET ORAL DAILY
COMMUNITY

## 2022-05-28 RX ORDER — CHLORAL HYDRATE 500 MG
1000 CAPSULE ORAL DAILY
COMMUNITY

## 2022-05-28 ASSESSMENT — ENCOUNTER SYMPTOMS
SINUS PAIN: 1
WHEEZING: 0
SWOLLEN GLANDS: 0
COUGH: 0
TROUBLE SWALLOWING: 0
DIZZINESS: 0
SHORTNESS OF BREATH: 0
FEVER: 0
GASTROINTESTINAL NEGATIVE: 1
HEADACHES: 1
SINUS PRESSURE: 1
APPETITE CHANGE: 1
EYES NEGATIVE: 1
SORE THROAT: 0
FATIGUE: 1

## 2022-08-04 RX ORDER — FLUOCINONIDE 0.5 MG/ML
SOLUTION TOPICAL
Qty: 60 | Refills: 3 | Status: CANCELLED

## 2023-04-26 ENCOUNTER — APPOINTMENT (OUTPATIENT)
Dept: URBAN - METROPOLITAN AREA CLINIC 244 | Age: 43
Setting detail: DERMATOLOGY
End: 2023-05-04

## 2023-04-26 ENCOUNTER — TELEPHONE (OUTPATIENT)
Dept: OBGYN CLINIC | Facility: CLINIC | Age: 43
End: 2023-04-26

## 2023-04-26 DIAGNOSIS — L82.0 INFLAMED SEBORRHEIC KERATOSIS: ICD-10-CM

## 2023-04-26 PROCEDURE — 17110 DESTRUCT B9 LESION 1-14: CPT

## 2023-04-26 PROCEDURE — OTHER LIQUID NITROGEN: OTHER

## 2023-04-26 ASSESSMENT — LOCATION SIMPLE DESCRIPTION DERM: LOCATION SIMPLE: NECK

## 2023-04-26 ASSESSMENT — LOCATION DETAILED DESCRIPTION DERM: LOCATION DETAILED: LEFT CENTRAL LATERAL NECK

## 2023-04-26 ASSESSMENT — LOCATION ZONE DERM: LOCATION ZONE: NECK

## 2023-04-26 NOTE — PROCEDURE: LIQUID NITROGEN
Add 52 Modifier (Optional): no
Show Topical Anesthesia Variable?: Yes
Detail Level: Detailed
Duration Of Freeze Thaw-Cycle (Seconds): 5-10
Medical Necessity Clause: This procedure was medically necessary because the lesions that were treated were:
Consent: The patient's consent was obtained including but not limited to risks of crusting, scabbing, blistering, scarring, darker or lighter pigmentary change, recurrence, incomplete removal and infection.
Post-Care Instructions: I reviewed with the patient in detail post-care instructions. Patient is to wear sunprotection, and avoid picking at any of the treated lesions. Pt may apply Vaseline to crusted or scabbing areas.
Medical Necessity Information: It is in your best interest to select a reason for this procedure from the list below. All of these items fulfill various CMS LCD requirements except the new and changing color options.
Number Of Freeze-Thaw Cycles: 1 freeze-thaw cycle

## 2023-05-09 ENCOUNTER — OFFICE VISIT (OUTPATIENT)
Dept: OBGYN CLINIC | Facility: CLINIC | Age: 43
End: 2023-05-09

## 2023-05-09 VITALS
WEIGHT: 160 LBS | HEIGHT: 66 IN | SYSTOLIC BLOOD PRESSURE: 121 MMHG | DIASTOLIC BLOOD PRESSURE: 80 MMHG | BODY MASS INDEX: 25.71 KG/M2 | HEART RATE: 66 BPM

## 2023-05-09 DIAGNOSIS — G43.829 MENSTRUAL MIGRAINE WITHOUT STATUS MIGRAINOSUS, NOT INTRACTABLE: ICD-10-CM

## 2023-05-09 DIAGNOSIS — Z12.31 ENCOUNTER FOR SCREENING MAMMOGRAM FOR MALIGNANT NEOPLASM OF BREAST: ICD-10-CM

## 2023-05-09 DIAGNOSIS — Z12.4 PAPANICOLAOU SMEAR FOR CERVICAL CANCER SCREENING: ICD-10-CM

## 2023-05-09 DIAGNOSIS — Z01.419 WOMEN'S ANNUAL ROUTINE GYNECOLOGICAL EXAMINATION: Primary | ICD-10-CM

## 2023-05-09 DIAGNOSIS — Z30.431 SURVEILLANCE OF (INTRAUTERINE) CONTRACEPTIVE DEVICE: ICD-10-CM

## 2023-05-09 PROCEDURE — 3074F SYST BP LT 130 MM HG: CPT | Performed by: ADVANCED PRACTICE MIDWIFE

## 2023-05-09 PROCEDURE — 3008F BODY MASS INDEX DOCD: CPT | Performed by: ADVANCED PRACTICE MIDWIFE

## 2023-05-09 PROCEDURE — 3079F DIAST BP 80-89 MM HG: CPT | Performed by: ADVANCED PRACTICE MIDWIFE

## 2023-05-09 PROCEDURE — 99396 PREV VISIT EST AGE 40-64: CPT | Performed by: ADVANCED PRACTICE MIDWIFE

## 2023-05-09 RX ORDER — SUMATRIPTAN 25 MG/1
25 TABLET, FILM COATED ORAL EVERY 2 HOUR PRN
Qty: 20 TABLET | Refills: 0 | Status: SHIPPED | OUTPATIENT
Start: 2023-05-09 | End: 2023-05-09

## 2023-05-09 RX ORDER — SUMATRIPTAN 25 MG/1
25 TABLET, FILM COATED ORAL EVERY 2 HOUR PRN
Qty: 10 TABLET | Refills: 1 | Status: SHIPPED | OUTPATIENT
Start: 2023-05-09

## 2023-05-10 LAB — HPV I/H RISK 1 DNA SPEC QL NAA+PROBE: NEGATIVE

## 2024-04-11 ENCOUNTER — APPOINTMENT (OUTPATIENT)
Dept: URBAN - METROPOLITAN AREA CLINIC 244 | Age: 44
Setting detail: DERMATOLOGY
End: 2024-04-11

## 2024-04-11 ENCOUNTER — RX ONLY (RX ONLY)
Age: 44
End: 2024-04-11

## 2024-04-11 DIAGNOSIS — L21.8 OTHER SEBORRHEIC DERMATITIS: ICD-10-CM

## 2024-04-11 DIAGNOSIS — L91.8 OTHER HYPERTROPHIC DISORDERS OF THE SKIN: ICD-10-CM

## 2024-04-11 PROCEDURE — 99214 OFFICE O/P EST MOD 30 MIN: CPT

## 2024-04-11 PROCEDURE — OTHER SKIN TAG REMOVAL (COSMETIC): OTHER

## 2024-04-11 PROCEDURE — OTHER COUNSELING: OTHER

## 2024-04-11 PROCEDURE — OTHER PRESCRIPTION MEDICATION MANAGEMENT: OTHER

## 2024-04-11 RX ORDER — FLUOCINONIDE 0.5 MG/ML
SOLUTION TOPICAL
Qty: 60 | Refills: 3 | Status: ERX

## 2024-04-11 RX ORDER — KETOCONAZOLE 20 MG/ML
SHAMPOO, SUSPENSION TOPICAL
Qty: 120 | Refills: 10 | Status: ERX | COMMUNITY
Start: 2024-04-11

## 2024-04-11 RX ORDER — FLUOCINONIDE 0.5 MG/ML
SOLUTION TOPICAL
Qty: 60 | Refills: 3 | Status: ERX | COMMUNITY
Start: 2024-04-11

## 2024-04-11 RX ORDER — KETOCONAZOLE 20 MG/ML
SHAMPOO, SUSPENSION TOPICAL
Qty: 120 | Refills: 10 | Status: ERX

## 2024-04-11 ASSESSMENT — LOCATION ZONE DERM
LOCATION ZONE: SCALP
LOCATION ZONE: ARM

## 2024-04-11 ASSESSMENT — LOCATION SIMPLE DESCRIPTION DERM
LOCATION SIMPLE: LEFT SHOULDER
LOCATION SIMPLE: POSTERIOR SCALP

## 2024-04-11 ASSESSMENT — LOCATION DETAILED DESCRIPTION DERM
LOCATION DETAILED: LEFT ANTERIOR SHOULDER
LOCATION DETAILED: RIGHT INFERIOR OCCIPITAL SCALP

## 2024-04-11 NOTE — PROCEDURE: SKIN TAG REMOVAL (COSMETIC)
Anesthesia Type: 1% lidocaine with epinephrine
Removed With: gradle excision
Consent: Written consent obtained and the risks of skin tag removal was reviewed with the patient including but not limited to bleeding, pigmentary change, infection, pain, and remote possibility of scarring.
Anesthesia Volume In Cc: 3
Detail Level: Simple
Price (Use Numbers Only, No Special Characters Or $): 58

## 2024-04-11 NOTE — PROCEDURE: PRESCRIPTION MEDICATION MANAGEMENT
Detail Level: Zone
Render In Strict Bullet Format?: No
Continue Regimen: Ketoconazole 2% shampoo and Fluocinonide 0.05% topical solution

## 2024-06-28 ENCOUNTER — OFFICE VISIT (OUTPATIENT)
Dept: INTERNAL MEDICINE CLINIC | Facility: CLINIC | Age: 44
End: 2024-06-28

## 2024-06-28 VITALS
SYSTOLIC BLOOD PRESSURE: 108 MMHG | OXYGEN SATURATION: 100 % | DIASTOLIC BLOOD PRESSURE: 66 MMHG | HEART RATE: 55 BPM | HEIGHT: 66 IN | TEMPERATURE: 98 F | WEIGHT: 133.63 LBS | BODY MASS INDEX: 21.47 KG/M2

## 2024-06-28 DIAGNOSIS — Z12.31 ENCOUNTER FOR SCREENING MAMMOGRAM FOR MALIGNANT NEOPLASM OF BREAST: ICD-10-CM

## 2024-06-28 DIAGNOSIS — Z00.00 ANNUAL PHYSICAL EXAM: Primary | ICD-10-CM

## 2024-06-28 DIAGNOSIS — R92.30 DENSE BREAST: ICD-10-CM

## 2024-06-28 PROCEDURE — 99396 PREV VISIT EST AGE 40-64: CPT | Performed by: INTERNAL MEDICINE

## 2024-06-28 RX ORDER — LEVOTHYROXINE SODIUM 0.03 MG/1
25 TABLET ORAL
COMMUNITY

## 2024-06-28 RX ORDER — SUMATRIPTAN 25 MG/1
25 TABLET, FILM COATED ORAL EVERY 2 HOUR PRN
Qty: 10 TABLET | Refills: 1 | Status: SHIPPED | OUTPATIENT
Start: 2024-06-28

## 2024-06-28 NOTE — PROGRESS NOTES
REASON FOR VISIT:    Nusrat Porter is a 43 year old female who presents for an Annual Health Assessment.     Her past medical history includes hashimoto's hypothyroidism, amenorrhea.   She was diagnosed with Hashimoto's in 2008, and has been struggling with her levels.  She reports irregular periods for as long as she can remember; controlling her thyroid and having an IUD placed seems to help. Started seeing integrative medicine doctor and feels better.   Her last pap was normal in June. She does have a history of HPV+ but colposcopy and pap smears have been normal.   She did need to use clomid to get pregnant. She has 3 children.   Surgical history includes D&C, colposcopy.     She is allergic to sulfa antibiotics.      Her last tetanus was in 2011  Her family history is updated and listed below. Denies smoking or illicit drug use. Reports occasional alcohol use.        Terzapetide since April---lost 30lbs. Now on maintenance dose      Patient Active Problem List   Diagnosis    Hypothyroid    Migraine without aura    Dermatitis    Hypopigmentation     General Health           CAGE:           Depression Screening (PHQ-2/PHQ-9):  Over the LAST 2 WEEKS             PREVENTATIVE SERVICES  INDICATIONS AND SCHEDULE Recommendation Internal Lab or Procedure   Colonoscopy Screen Every 10 years Health Maintenance   Topic Date Due    Colorectal Cancer Screening  Never done      Flex Sigmoidoscopy Screen  Every 5 years No results found for this or any previous visit.   Fecal Occult Blood  Annually No results found for: \"FOB\", \"OCCULTSTOOL\"   Obesity Screening Screen all adults annually Body mass index is 21.56 kg/m².     Preventive Services for Which Recommendations Vary with Risk Recommendation Internal Lab or Procedure   Cholesterol Screening Recommended screening varies with age, risk and gender LDL Cholesterol (mg/dL)   Date Value   12/21/2021 110   08/29/2014 97      Diabetes Screening  If history of high  blood pressure or other  risk factors GLYCOHEMOGLOBIN (HgA1c) (L) (%)   Date Value   10/29/2014 4.9     Glucose (mg/dL)   Date Value   12/21/2021 94        Gonorrhea Screening If high risk No results found for: \"GONOCOCCUS\"   HIV Screening For all adults age 18-65, older adults at increased risk No results found for: \"HIV\"   Syphilis Screening Screen if pregnant or high risk No results found for: \"RPR\"   Hepatitis C Screening Screen pts at high risk plus screen one time for adults born 1945 - 1965 Hepatitis C Virus Antibody (no units)   Date Value   10/29/2014 Non-Reactive      Tuberculosis Screen If high risk No components found for: \"PPDINDURAT\"       SPECIFIC DISEASE MONITORING Internal Lab or Procedure   No disease specific diagnoses    ALLERGIES:     Allergies   Allergen Reactions    Sulfa Antibiotics HIVES     CURRENT MEDICATIONS:   Current Outpatient Medications   Medication Sig Dispense Refill    levothyroxine 25 MCG Oral Tab Take 1 tablet (25 mcg total) by mouth before breakfast.      SUMAtriptan 25 MG Oral Tab Take 1 tablet (25 mg total) by mouth every 2 (two) hours as needed for Migraine. Take 1 tab at onset of migraine. May repeat for 1 dose after 2 hours 10 tablet 1    NP THYROID 15 MG Oral Tab Take 3 tablets (45 mg total) by mouth daily.      Fluocinolone Acetonide 0.01 % External Solution Apply 1 Application topically 2 (two) times daily. (Patient taking differently: Apply 1 Application  topically 2 (two) times daily. As needed) 90 mL 3    Ketoconazole 2 % External Shampoo Apply 5 mL topically twice a week. 120 mL 5    omega-3 fatty acids 1000 MG Oral Cap Take 1,000 mg by mouth daily.      multivitamin Oral Tab Take 1 tablet by mouth daily.      Acidophilus/Pectin Oral Cap Take 1 capsule by mouth daily.      ALPRAZolam (XANAX) 0.5 MG Oral Tab Take 1 tablet (0.5 mg total) by mouth nightly as needed for Sleep. (Patient not taking: Reported on 6/28/2024) 10 tablet 0    ergocalciferol 02292 units Oral Cap  Take 1 capsule (50,000 Units total) by mouth once a week. During the winter months (Patient not taking: Reported on 2024) 12 capsule 3      MEDICAL INFORMATION:   Past Medical History:    Amenorrhea    Pt reports menses have always been irregular.    Decorative tattoo    History of chicken pox    Hypothyroid    Medication    Hypothyroidism    Infertility, female    Pt used Clomid before 2nd and 3rd pregnancy      Past Surgical History:   Procedure Laterality Date      10/2011    Colposcopy, cervix w/upper adjacent vagina; w/biopsy(s), cervix      Colpo Negative.  Paps Negative since.    D & c      Needle biopsy left  2019    US Bx      Family History   Problem Relation Age of Onset    Alcohol and Other Disorders Associated Mother             Other (hypothyroid) Mother     Other (Other) Son         Second baby dx with VSD after delivery    Hypertension Father     Heart Disorder Father     Cancer Paternal Uncle         unknown type    Other (congestive heart failure) Maternal Grandmother     Other (congestive heart failure) Maternal Grandfather     Heart Attack Paternal Grandmother     Hypertension Paternal Grandmother     Other (rheumatoid arthr) Paternal Grandmother       SOCIAL HISTORY:   Social History     Socioeconomic History    Marital status:      Spouse name: Srinivas Porter    Number of children: 2    Years of education: 18   Occupational History    Occupation: Clinical      Comment: Full Time   Tobacco Use    Smoking status: Never    Smokeless tobacco: Never   Vaping Use    Vaping status: Never Used   Substance and Sexual Activity    Alcohol use: Yes     Comment: socially    Drug use: No   Other Topics Concern    Blood Transfusions No    Caffeine Concern Yes     Comment: 2 cups coffee daily prior to pregnancy    Occupational Exposure Yes     Comment: Works in Healthcare    Sleep Concern No    Weight Concern No    Special Diet No    Exercise Yes     Comment:  1 x week  weights and cardio    Seat Belt Yes     Occ:  :      REVIEW OF SYSTEMS:   GENERAL: feels well otherwise  SKIN: denies any unusual skin lesions  EYES: denies blurred vision or double vision  HEENT: denies nasal congestion, sinus pain or ST  LUNGS: denies shortness of breath with exertion  CARDIOVASCULAR: denies chest pain on exertion  GI: denies abdominal pain, denies heartburn  : denies dysuria, vaginal discharge or itching, periods regular   NEURO: denies headaches, vision changes      EXAM:   /66 (BP Location: Right arm, Patient Position: Sitting, Cuff Size: adult)   Pulse 55   Temp 98 °F (36.7 °C) (Oral)   Ht 5' 6\" (1.676 m)   Wt 133 lb 9.6 oz (60.6 kg)   SpO2 100%   BMI 21.56 kg/m²   >   BP Readings from Last 3 Encounters:   06/28/24 108/66   05/09/23 121/80   12/21/21 102/70     No LMP recorded. (Menstrual status: IUD - Intrauterine Device).    GENERAL: well developed, well nourished, in no apparent distress  SKIN: no rashes, no suspicious lesions  HEENT: atraumatic, normocephalic, ears and throat are clear  EYES:PERRLA, EOMI, conjunctiva are clear. NECK: supple, no adenopathy, no bruits  CHEST: no chest tenderness  BREAST: no masses, no discharge, no axillary lymphadenopathy   LUNGS: clear to auscultation  CARDIO: RRR without murmur  GI: good BS's, no masses, HSM or tenderness  :deferred  EXTREMITIES: no cyanosis, clubbing or edema  NEURO: Oriented times three, cranial nerves are intact, motor and sensory are grossly intact      ASSESSMENT AND OTHER RELEVANT CHRONIC CONDITIONS:   Nusrat Porter is a 43 year old female who presents for an Annual Health Assessment.     PLAN SUMMARY:     The patient indicates understanding of these issues and agrees to the plan.  The patient is asked to return in 1 year for AHA       1. Annual physical exam  UTD on vaccinations. Last pap 5/2023  Reviewed blood work  Ordered mammogram  Recommended colonoscopy      2. Vitamin D  deficiency  Pt taking vitamin D    3. Hashimoto's disease  Follows with integrative medicine    4. Menstrual migraines  Controlled with imitrex and excedrin    5. Situational anxiety  Takes xanax for  dental visit            SUGGESTED VACCINATIONS - Influenza, Pneumococcal, Zoster, Tetanus, HPV   Influenza: No recommendations at this time  Pneumonia: No recommendations at this time  HPV: No recommendations at this time  Tdap: No recommendations at this time  Shingles:     Influenza Annually   Pneumococcal if high risk   Td/Tdap once then every 10 years   HPV Males 11-21   Zoster (Shingles) 60 and older: one dose   Varicella 2 doses if not immune   MMR 1-2 doses if born after 1958 and not immune

## 2024-11-12 ENCOUNTER — TELEPHONE (OUTPATIENT)
Dept: OBGYN CLINIC | Facility: CLINIC | Age: 44
End: 2024-11-12

## 2024-11-12 NOTE — TELEPHONE ENCOUNTER
Patient verified name and     Patient informed IUD is good up to 8 years. Patient was unaware, denies any current issues with her IUD. Will call us back if she has any issues.

## 2024-11-12 NOTE — TELEPHONE ENCOUNTER
Patient called wanting to schedule IUD removal and re-insertion. She has the mirena IUD.      Patient will be available today after 3 pm.  She is available tomorrow morning until 10 am and after 2pm

## 2025-05-10 ENCOUNTER — HOSPITAL ENCOUNTER (OUTPATIENT)
Dept: MAMMOGRAPHY | Facility: HOSPITAL | Age: 45
Discharge: HOME | End: 2025-05-10
Attending: INTERNAL MEDICINE
Payer: COMMERCIAL

## 2025-05-10 DIAGNOSIS — Z12.31 ENCOUNTER FOR SCREENING MAMMOGRAM FOR MALIGNANT NEOPLASM OF BREAST: ICD-10-CM

## 2025-05-10 DIAGNOSIS — R92.30 DENSE BREAST: ICD-10-CM

## 2025-05-10 PROCEDURE — 77063 BREAST TOMOSYNTHESIS BI: CPT | Performed by: INTERNAL MEDICINE

## 2025-05-10 PROCEDURE — 77067 SCR MAMMO BI INCL CAD: CPT | Performed by: INTERNAL MEDICINE

## (undated) NOTE — MR AVS SNAPSHOT
[facility] [address] [address]  [phone]               Thank you for choosing us for your health care visit with Kellee Singer DO. We are glad to serve you and happy to provide you with this summary of your visit.   Please he taking this medication, and follow the directions you see here.                 Where to Get Your Medications      [facility] [address] [facility], [address] [facility]  [address], [phone]

## (undated) NOTE — LETTER
10/8/2019              Heladio Ochoa        [facility] [address]         Dear Elliott Sender,    Our records indicate that the tests ordered for you by Star Calabrese MD  have not been done.   If you have, in fact, already completed the tests o

## (undated) NOTE — LETTER
[address] [place] [address]  Authorization for Surgical Operation or Procedure  Date: ______________       Time: _______________  1.  I hereby authorize Dr. Elaine Whitfield, my physician and the assistant, to perform the following operation an 5. I consent to the photographing of the operations or procedures to be performed for the purposes of advancing medicine, science, and/or education, provided my identity is not revealed.  If the procedure has been videotaped, the physician/surgeon will obta risks and benefits involved in the proposed treatment and any reasonable alternative to the proposed treatment. I have also explained the risks and benefits involved in the refusal of the proposed treatment and have answered the patient's questions.  If I h

## (undated) NOTE — MR AVS SNAPSHOT
[place]  [address]  [phone]               Thank you for choosing us for your health care visit with Rodrigo Ji MD.  We are glad to serve you and happy to provide you with this summary CT SOFT TISSUE OF NECK(CONTRAST ONLY) (CPT=70491)    Complete by:  Jan 23, 2017 (Approximate)    Assoc Dx:  Lymph node enlargement [R59.9]                 Follow-up Instructions     Return if symptoms worsen or fail to improve.       Scheduling Instruction